# Patient Record
Sex: FEMALE | Race: BLACK OR AFRICAN AMERICAN | Employment: UNEMPLOYED | URBAN - METROPOLITAN AREA
[De-identification: names, ages, dates, MRNs, and addresses within clinical notes are randomized per-mention and may not be internally consistent; named-entity substitution may affect disease eponyms.]

---

## 2018-11-28 ENCOUNTER — OFFICE VISIT (OUTPATIENT)
Dept: ENDOCRINOLOGY | Age: 63
End: 2018-11-28
Payer: MEDICARE

## 2018-11-28 VITALS
BODY MASS INDEX: 32.62 KG/M2 | RESPIRATION RATE: 16 BRPM | WEIGHT: 207.8 LBS | HEIGHT: 67 IN | OXYGEN SATURATION: 98 % | SYSTOLIC BLOOD PRESSURE: 124 MMHG | DIASTOLIC BLOOD PRESSURE: 80 MMHG | HEART RATE: 69 BPM

## 2018-11-28 DIAGNOSIS — E55.9 VITAMIN D DEFICIENCY: ICD-10-CM

## 2018-11-28 DIAGNOSIS — E23.6 EMPTY SELLA SYNDROME (HCC): ICD-10-CM

## 2018-11-28 DIAGNOSIS — E83.52 HYPERCALCEMIA: ICD-10-CM

## 2018-11-28 DIAGNOSIS — M81.0 OSTEOPOROSIS, UNSPECIFIED OSTEOPOROSIS TYPE, UNSPECIFIED PATHOLOGICAL FRACTURE PRESENCE: ICD-10-CM

## 2018-11-28 DIAGNOSIS — E21.3 HYPERPARATHYROIDISM (HCC): Primary | ICD-10-CM

## 2018-11-28 PROCEDURE — 99204 OFFICE O/P NEW MOD 45 MIN: CPT | Performed by: INTERNAL MEDICINE

## 2018-11-28 RX ORDER — ALLOPURINOL 300 MG/1
300 TABLET ORAL DAILY
COMMUNITY
Start: 2018-11-01

## 2018-11-28 NOTE — PROGRESS NOTES
fragility fractures, muscle weakness, skin tags or increase shoe size. PAST MEDICAL HISTORY   Past Medical History:   Diagnosis Date    [facility] ([facility]     Cancer of body of stomach ([facility]     [facility]     GERD (gastroesophageal reflux disease)     Hypercholesteremia     Hypertension     Unspecified cerebral artery occlusion with cerebral infarction      PAST SURGICAL HISTORY   Past Surgical History:   Procedure Laterality Date    APPENDECTOMY      STOMACH SURGERY      cancer     SOCIAL HISTORY   Social History     Social History    Marital status:      Spouse name: N/A    Number of children: N/A    Years of education: N/A     Occupational History    Not on file. Social History Main Topics    Smoking status: Former Smoker     Quit date: 9/6/1989    Smokeless tobacco: Former User     Quit date: 11/30/1985    Alcohol use No    Drug use: No    Sexual activity: Not on file     Other Topics Concern    Not on file     Social History Narrative    No narrative on file     FAMILY HISTORY   No family history on file. ALLERGIES AND DRUG REACTIONS   Allergies   Allergen Reactions    Vicodin [Hydrocodone-Acetaminophen]        CURRENT MEDICATIONS     Current Outpatient Prescriptions   Medication Sig Dispense Refill    allopurinol (ZYLOPRIM) 300 MG tablet Take 300 mg by mouth daily       atenolol-chlorthalidone (TENORETIC) 100-25 MG per tablet Take 1 tablet by mouth daily      irbesartan (AVAPRO) 150 MG tablet Take 150 mg by mouth daily      simvastatin (ZOCOR) 40 MG tablet Take 40 mg by mouth nightly.  glimepiride (AMARYL) 4 MG tablet Take 4 mg by mouth 2 times daily.  amLODIPine (NORVASC) 10 MG tablet Take 10 mg by mouth daily.  metformin (GLUCOPHAGE) 500 MG tablet Take 1,000 mg by mouth 2 times daily (with meals)        No current facility-administered medications for this visit.         Review of Systems    Constitutional: No fever, no chills, no AST 13, GFR 55        Lab Results   Component Value Date/Time     09/06/2017 03:05 AM    K 3.4 (L) 09/06/2017 03:05 AM    CO2 25 09/06/2017 03:05 AM    BUN 22 09/06/2017 03:05 AM    CALCIUM 9.6 09/06/2017 03:05 AM      No results found for: PTH  No results found for: MG  No results found for: PHOS  No results found for: VITD25  No results found for: PTHRP  No results found for: Nitish Blow  No results found for: URINEVOLUME  Lab Results   Component Value Date/Time    WBC 6.3 09/06/2017 03:05 AM    RBC 3.98 09/06/2017 03:05 AM    HGB 11.3 (L) 09/06/2017 03:05 AM    HCT 35.4 09/06/2017 03:05 AM    MCV 88.9 09/06/2017 03:05 AM    MCH 28.4 09/06/2017 03:05 AM    MCHC 31.9 (L) 09/06/2017 03:05 AM    RDW 15.1 (H) 09/06/2017 03:05 AM     09/06/2017 03:05 AM    MPV 10.3 09/06/2017 03:05 AM     Lab Results   Component Value Date/Time    GLUCOSE 159 (H) 09/06/2017 03:05 AM    GLUCOSE 100 06/04/2014 01:15 AM    GLUCOSE 149 (H) 02/19/2013 11:02 AM       All labs medical records and images reviewed independently   Pituitary MRI 2/2008   There is a 1.7 x 1.2 x 1.2  Pituitary adenoma     Repeated MRI in 5/25/2010  Empty sella variant with some displacement of pituitary gland posterior and inferior due to CSF within the sella. No pituitary micro or macroadenoma. Appearance of sella and pituitary gland is similar to that seen on previous scan from 2/2008     Impression and plan: Nia Flowers who is 70 y.o. female in the clinic today for management hyperparathtroidism    Primary hyperparathyroidism   · PTH elevation at the time the calcium is elevated (or high normal) is most consistent with primary hyperparathyroidism.   · Late onset and the absence of other affected family members would also decrease the likelihood of this being  hyperparathyroidism associated with an MEN syndrome or [address]  · With degree of hypercalcemia she will need parathyroid surgery  · Today I will check CMP, PTH, VitD, 24 hrs urine calcium  · Ordered DXA scan to Hip, spine and none-dominant forearm    · Will follow the result and proceed accordingly     Empty sella syndrome   · MRI in 2008 showed 1.7 cm pituitary adenoma but repeated MRI in 2010 showed only empty sella syndrome and no micro or macroadenoma   · Will assess pituitary function (TSH, free T4, GH, IGF-1, AM cortisol, ACTH, Prolactin, FSH, LH)   · I will also order Pituitary MRI as last MRI was 8 years ago and pt still c/o headache     Follow up  · 8 weeks     The above issues were reviewed with the patient who understood and agreed with the plan. 30 minutes were spent today in management of this patient. More than 50% of time spent on counseling of patient on above diagnosis. Thank you for allowing us to participate in the care of this patient. Please do not hesitate to contact us with any additional questions. Diagnosis Orders   1. Hyperparathyroidism ([address]  Calcium, 24 HR Urine    Creatinine, 24 HR Urine    Comprehensive Metabolic Panel    DEXA bone density peripheral    PTH, Intact   2. Hypercalcemia  Calcium, 24 HR Urine    Creatinine, 24 HR Urine    Comprehensive Metabolic Panel    DEXA bone density peripheral    PTH, Intact   3. Vitamin D deficiency  Comprehensive Metabolic Panel    Vitamin D 25 Hydrox, D2 & D3   4. Osteoporosis, unspecified osteoporosis type, unspecified pathological fracture presence  DEXA Bone Density Axial Skeleton    DEXA bone density peripheral   5.  Empty sella syndrome ([address]  ACTH    Cortisol Total    Estradiol    Follicle Stimulating Hormone    Growth Hormone    Insulin-Like Growth Factor    Luteinizing Hormone    Prolactin    T4, Free    T4    TSH without Reflex    MRI BRAIN Bess Cota MD  Endocrinologist, [facility])   [facility] [place] [address] [facility] [address]   Phone: [phone]  Fax: [phone]  ------------------------------

## 2018-11-28 NOTE — LETTER
FAMILY HISTORY   No family history on file. ALLERGIES AND DRUG REACTIONS   Allergies   Allergen Reactions    Vicodin [Hydrocodone-Acetaminophen]        CURRENT MEDICATIONS     Current Outpatient Prescriptions   Medication Sig Dispense Refill    allopurinol (ZYLOPRIM) 300 MG tablet Take 300 mg by mouth daily       atenolol-chlorthalidone (TENORETIC) 100-25 MG per tablet Take 1 tablet by mouth daily      irbesartan (AVAPRO) 150 MG tablet Take 150 mg by mouth daily      simvastatin (ZOCOR) 40 MG tablet Take 40 mg by mouth nightly.  glimepiride (AMARYL) 4 MG tablet Take 4 mg by mouth 2 times daily.  amLODIPine (NORVASC) 10 MG tablet Take 10 mg by mouth daily.  metformin (GLUCOPHAGE) 500 MG tablet Take 1,000 mg by mouth 2 times daily (with meals)        No current facility-administered medications for this visit. Review of Systems    Constitutional: No fever, no chills, no diaphoresis, no generalized weakness. HEENT: No blurred vision, No sore throat, no ear pain, no hair loss  Neck: denied any neck swelling, difficulty swallowing,   Cadrdiopulomary: No CP, SOB or palpitation, No orthopnea or PND. No cough or wheezing. GI: No N/V/D, no constipation, No abdominal pain, no melena or hematochezia   : Denied any dysuria, hematuria, flank pain, discharge, or incontinence. Skin: denied any rash, ulcer, Hirsute, or hyperpigmentation. MSK: denied any joint deformity, joint pain/swelling, muscle pain, or back pain. Neuro: no numbess, no tingling, no weakness, __  Psychiatric/Behavioral: Negative for sleep disturbance and dysphoric mood. The patient is not nervous/anxious.      Objective:   /80 (Site: Left Upper Arm, Position: Sitting, Cuff Size: Medium Adult)   Pulse 69   Resp 16   Ht 5' 7\" (1.702 m)   Wt 207 lb 12.8 oz (94.3 kg)   SpO2 98%   BMI 32.55 kg/m²    BP Readings from Last 4 Encounters:   11/28/18 124/80   09/06/17 131/68   07/15/17 124/67   09/12/15 113/68 Wt Readings from Last 6 Encounters:   11/28/18 207 lb 12.8 oz (94.3 kg)   09/06/17 220 lb (99.8 kg)   07/15/17 210 lb (95.3 kg)   09/12/15 212 lb (96.2 kg)   12/09/14 210 lb (95.3 kg)   06/04/14 200 lb (90.7 kg)       Physical examination:  General: awake alert, no abnormal position or movements. HEENT: normocephalic non traumatic. Neck: supple, no LN enlargement, no thyromegaly, no thyroid tenderness, no JVD. Pulm: clear equal air entry no added sounds,  CVS: S1 + S2, no murmur, no heave. Abd: soft lax no tenderness, no organomegaly, audible bowel sounds. MSK: no back deformity, no local spine tesnderness  Skin: warm, no lesions, no rash. No striae no Bruises   Neuro: CN intact, sensation notmal , muscle power normal  Psych: normal mood, and affect     Lab review   3/2018  Ca 11.1, Na 140, K 3.5, Cr 1.26, GFR 52    9/26/2018  A1c 6.7, WBC 6.7, Plt 246, Hb 12.3, Na 139, K 3.8, Cr 1.2, Ca 11.3  ALT 9.  AST 13, GFR 55        Lab Results   Component Value Date/Time     09/06/2017 03:05 AM    K 3.4 (L) 09/06/2017 03:05 AM    CO2 25 09/06/2017 03:05 AM    BUN 22 09/06/2017 03:05 AM    CALCIUM 9.6 09/06/2017 03:05 AM      No results found for: PTH  No results found for: MG  No results found for: PHOS  No results found for: VITD25  No results found for: PTHRP  No results found for: Blanca Chay  No results found for: URINEVOLUME  Lab Results   Component Value Date/Time    WBC 6.3 09/06/2017 03:05 AM    RBC 3.98 09/06/2017 03:05 AM    HGB 11.3 (L) 09/06/2017 03:05 AM    HCT 35.4 09/06/2017 03:05 AM    MCV 88.9 09/06/2017 03:05 AM    MCH 28.4 09/06/2017 03:05 AM    MCHC 31.9 (L) 09/06/2017 03:05 AM    RDW 15.1 (H) 09/06/2017 03:05 AM     09/06/2017 03:05 AM    MPV 10.3 09/06/2017 03:05 AM     Lab Results   Component Value Date/Time    GLUCOSE 159 (H) 09/06/2017 03:05 AM    GLUCOSE 100 06/04/2014 01:15 AM    GLUCOSE 149 (H) 02/19/2013 11:02 AM All labs medical records and images reviewed independently   Pituitary MRI 2/2008   There is a 1.7 x 1.2 x 1.2  Pituitary adenoma     Repeated MRI in 5/25/2010  Empty sella variant with some displacement of pituitary gland posterior and inferior due to CSF within the sella. No pituitary micro or macroadenoma. Appearance of sella and pituitary gland is similar to that seen on previous scan from 2/2008     Impression and plan: Rock Potter who is 70 y.o. female in the clinic today for management hyperparathtroidism    Primary hyperparathyroidism   · PTH elevation at the time the calcium is elevated (or high normal) is most consistent with primary hyperparathyroidism. · Late onset and the absence of other affected family members would also decrease the likelihood of this being  hyperparathyroidism associated with an MEN syndrome or [address]  · With degree of hypercalcemia she will need parathyroid surgery  · Today I will check CMP, PTH, VitD, 24 hrs urine calcium  · Ordered DXA scan to Hip, spine and none-dominant forearm    · Will follow the result and proceed accordingly     Empty sella syndrome   · MRI in 2008 showed 1.7 cm pituitary adenoma but repeated MRI in 2010 showed only empty sella syndrome and no micro or macroadenoma   · Will assess pituitary function (TSH, free T4, GH, IGF-1, AM cortisol, ACTH, Prolactin, FSH, LH)   · I will also order Pituitary MRI as last MRI was 8 years ago and pt still c/o headache     Follow up  · 8 weeks     The above issues were reviewed with the patient who understood and agreed with the plan. 30 minutes were spent today in management of this patient. More than 50% of time spent on counseling of patient on above diagnosis. Thank you for allowing us to participate in the care of this patient. Please do not hesitate to contact us with any additional questions. Diagnosis Orders   1.  Hyperparathyroidism ([address]  Calcium, 24 HR Urine    Creatinine, 24 HR Urine

## 2018-11-29 ENCOUNTER — HOSPITAL ENCOUNTER (OUTPATIENT)
Age: 63
Discharge: HOME | End: 2018-11-29
Payer: MEDICARE

## 2018-11-29 DIAGNOSIS — E21.3 HYPERPARATHYROIDISM (HCC): ICD-10-CM

## 2018-11-29 DIAGNOSIS — E83.52 HYPERCALCEMIA: ICD-10-CM

## 2018-11-29 DIAGNOSIS — E55.9 VITAMIN D DEFICIENCY: ICD-10-CM

## 2018-11-29 LAB
ALBUMIN SERPL-MCNC: 4.2 G/DL (ref 3.5–5.2)
ALP BLD-CCNC: 149 U/L (ref 35–104)
ALT SERPL-CCNC: 13 U/L (ref 0–32)
ANION GAP SERPL CALCULATED.3IONS-SCNC: 13 MMOL/L (ref 7–16)
AST SERPL-CCNC: 15 U/L (ref 0–31)
BILIRUB SERPL-MCNC: 0.4 MG/DL (ref 0–1.2)
BUN BLDV-MCNC: 30 MG/DL (ref 8–23)
CALCIUM SERPL-MCNC: 11.1 MG/DL (ref 8.6–10.2)
CHLORIDE BLD-SCNC: 100 MMOL/L (ref 98–107)
CO2: 25 MMOL/L (ref 22–29)
CORTISOL TOTAL: 10.46 MCG/DL (ref 2.68–18.4)
CREAT SERPL-MCNC: 1.1 MG/DL (ref 0.5–1)
ESTRADIOL LEVEL: 10.4 PG/ML
FOLLICLE STIMULATING HORMONE: 67.9 MIU/ML
GFR AFRICAN AMERICAN: >60
GFR NON-AFRICAN AMERICAN: >60 ML/MIN/1.73
GLUCOSE BLD-MCNC: 262 MG/DL (ref 74–99)
LUTEINIZING HORMONE: 34.1 MIU/ML
PARATHYROID HORMONE INTACT: 143 PG/ML (ref 15–65)
POTASSIUM SERPL-SCNC: 3.5 MMOL/L (ref 3.5–5)
PROLACTIN: 18.8 NG/ML
SODIUM BLD-SCNC: 138 MMOL/L (ref 132–146)
T4 FREE: 0.95 NG/DL (ref 0.93–1.7)
T4 TOTAL: 6.1 MCG/DL (ref 4.5–11.7)
TOTAL PROTEIN: 8.5 G/DL (ref 6.4–8.3)
TSH SERPL DL<=0.05 MIU/L-ACNC: 1.69 UIU/ML (ref 0.27–4.2)
VITAMIN D 25-HYDROXY: 12 NG/ML (ref 30–100)

## 2018-11-29 PROCEDURE — 82533 TOTAL CORTISOL: CPT

## 2018-11-29 PROCEDURE — 83970 ASSAY OF PARATHORMONE: CPT

## 2018-11-29 PROCEDURE — 82306 VITAMIN D 25 HYDROXY: CPT

## 2018-11-29 PROCEDURE — 84439 ASSAY OF FREE THYROXINE: CPT

## 2018-11-29 PROCEDURE — 83002 ASSAY OF GONADOTROPIN (LH): CPT

## 2018-11-29 PROCEDURE — 80053 COMPREHEN METABOLIC PANEL: CPT

## 2018-11-29 PROCEDURE — 83003 ASSAY GROWTH HORMONE (HGH): CPT

## 2018-11-29 PROCEDURE — 84305 ASSAY OF SOMATOMEDIN: CPT

## 2018-11-29 PROCEDURE — 83001 ASSAY OF GONADOTROPIN (FSH): CPT

## 2018-11-29 PROCEDURE — 82024 ASSAY OF ACTH: CPT

## 2018-11-29 PROCEDURE — 82670 ASSAY OF TOTAL ESTRADIOL: CPT

## 2018-11-29 PROCEDURE — 84443 ASSAY THYROID STIM HORMONE: CPT

## 2018-11-29 PROCEDURE — 84146 ASSAY OF PROLACTIN: CPT

## 2018-11-29 PROCEDURE — 36415 COLL VENOUS BLD VENIPUNCTURE: CPT

## 2018-11-30 ENCOUNTER — TELEPHONE (OUTPATIENT)
Dept: ENDOCRINOLOGY | Age: 63
End: 2018-11-30

## 2018-11-30 DIAGNOSIS — E55.9 VITAMIN D DEFICIENCY: Primary | ICD-10-CM

## 2018-11-30 DIAGNOSIS — E21.0 PRIMARY HYPERPARATHYROIDISM (HCC): ICD-10-CM

## 2018-12-01 LAB
ADRENOCORTICOTROPIC HORMONE: 29 PG/ML (ref 6–58)
IGF-1 (INSULIN-LIKE GROWTH I): 176 NG/ML (ref 34–241)
INSULIN-LIKE GROWTH FACTOR-1 Z-SCORE: 1

## 2018-12-02 LAB — GROWTH HORMONE: <0.05 NG/ML (ref 0.05–8)

## 2018-12-03 ENCOUNTER — HOSPITAL ENCOUNTER (OUTPATIENT)
Age: 63
Discharge: HOME | End: 2018-12-03
Payer: MEDICARE

## 2018-12-03 DIAGNOSIS — E21.3 HYPERPARATHYROIDISM (HCC): ICD-10-CM

## 2018-12-03 DIAGNOSIS — E83.52 HYPERCALCEMIA: ICD-10-CM

## 2018-12-03 LAB
24HR URINE VOLUME (ML): 1700 ML
CALCIUM 24 HOUR URINE: 37 MG/24 HR (ref 100–300)
CREATININE 24 HOUR URINE: 1513 MG/24H (ref 720–1510)
Lab: 24 HOURS

## 2018-12-03 PROCEDURE — 82340 ASSAY OF CALCIUM IN URINE: CPT

## 2018-12-04 ENCOUNTER — TELEPHONE (OUTPATIENT)
Dept: ENDOCRINOLOGY | Age: 63
End: 2018-12-04

## 2018-12-05 ENCOUNTER — TELEPHONE (OUTPATIENT)
Dept: ENDOCRINOLOGY | Age: 63
End: 2018-12-05

## 2018-12-05 DIAGNOSIS — E83.52 HYPERCALCEMIA: Primary | ICD-10-CM

## 2018-12-05 RX ORDER — ATENOLOL 100 MG/1
100 TABLET ORAL DAILY
Qty: 30 TABLET | Refills: 3
Start: 2018-12-05

## 2018-12-11 ENCOUNTER — HOSPITAL ENCOUNTER (OUTPATIENT)
Dept: ULTRASOUND IMAGING | Age: 63
Discharge: HOME | End: 2018-12-13
Payer: MEDICARE

## 2018-12-11 ENCOUNTER — TELEPHONE (OUTPATIENT)
Dept: ENDOCRINOLOGY | Age: 63
End: 2018-12-11

## 2018-12-11 ENCOUNTER — HOSPITAL ENCOUNTER (OUTPATIENT)
Dept: NUCLEAR MEDICINE | Age: 63
Discharge: HOME | End: 2018-12-13
Payer: MEDICARE

## 2018-12-11 DIAGNOSIS — E21.0 PRIMARY HYPERPARATHYROIDISM (HCC): ICD-10-CM

## 2018-12-11 PROCEDURE — 76536 US EXAM OF HEAD AND NECK: CPT

## 2018-12-11 PROCEDURE — 3430000000 HC RX DIAGNOSTIC RADIOPHARMACEUTICAL: Performed by: RADIOLOGY

## 2018-12-11 PROCEDURE — 78070 PARATHYROID PLANAR IMAGING: CPT

## 2018-12-11 PROCEDURE — A9500 TC99M SESTAMIBI: HCPCS | Performed by: RADIOLOGY

## 2018-12-11 RX ADMIN — Medication 27 MILLICURIE: at 09:11

## 2018-12-21 ENCOUNTER — HOSPITAL ENCOUNTER (OUTPATIENT)
Dept: GENERAL RADIOLOGY | Age: 63
Discharge: HOME | End: 2018-12-23
Payer: MEDICARE

## 2018-12-21 ENCOUNTER — APPOINTMENT (OUTPATIENT)
Dept: GENERAL RADIOLOGY | Age: 63
End: 2018-12-21
Payer: MEDICARE

## 2018-12-21 DIAGNOSIS — M81.0 OSTEOPOROSIS, UNSPECIFIED OSTEOPOROSIS TYPE, UNSPECIFIED PATHOLOGICAL FRACTURE PRESENCE: ICD-10-CM

## 2018-12-21 PROCEDURE — 77080 DXA BONE DENSITY AXIAL: CPT

## 2018-12-30 ENCOUNTER — TELEPHONE (OUTPATIENT)
Dept: ENDOCRINOLOGY | Age: 63
End: 2018-12-30

## 2019-01-21 ENCOUNTER — HOSPITAL ENCOUNTER (OUTPATIENT)
Age: 64
Discharge: HOME | End: 2019-01-21
Payer: MEDICARE

## 2019-01-21 DIAGNOSIS — E83.52 HYPERCALCEMIA: ICD-10-CM

## 2019-01-21 LAB
24HR URINE VOLUME (ML): 3000 ML
24HR URINE VOLUME (ML): 3000 ML
CALCIUM 24 HOUR URINE: 267 MG/24 HR (ref 100–300)
CREATININE 24 HOUR URINE: 1320 MG/24H (ref 720–1510)
CREATININE 24 HOUR URINE: 1350 MG/24H (ref 720–1510)
Lab: 24 HOURS
Lab: 24 HOURS

## 2019-01-21 PROCEDURE — 82340 ASSAY OF CALCIUM IN URINE: CPT

## 2019-01-21 PROCEDURE — 82570 ASSAY OF URINE CREATININE: CPT

## 2019-01-22 ENCOUNTER — TELEPHONE (OUTPATIENT)
Dept: ENDOCRINOLOGY | Age: 64
End: 2019-01-22

## 2019-01-23 ENCOUNTER — OFFICE VISIT (OUTPATIENT)
Dept: ENDOCRINOLOGY | Age: 64
End: 2019-01-23
Payer: MEDICARE

## 2019-01-23 ENCOUNTER — HOSPITAL ENCOUNTER (OUTPATIENT)
Age: 64
Discharge: HOME | End: 2019-01-23
Payer: MEDICARE

## 2019-01-23 ENCOUNTER — TELEPHONE (OUTPATIENT)
Dept: ENDOCRINOLOGY | Age: 64
End: 2019-01-23

## 2019-01-23 VITALS
HEART RATE: 70 BPM | DIASTOLIC BLOOD PRESSURE: 88 MMHG | OXYGEN SATURATION: 97 % | HEIGHT: 67 IN | SYSTOLIC BLOOD PRESSURE: 140 MMHG | RESPIRATION RATE: 16 BRPM | WEIGHT: 216.8 LBS | BODY MASS INDEX: 34.03 KG/M2

## 2019-01-23 DIAGNOSIS — E21.0 PRIMARY HYPERPARATHYROIDISM (HCC): Primary | ICD-10-CM

## 2019-01-23 DIAGNOSIS — E55.9 VITAMIN D DEFICIENCY: ICD-10-CM

## 2019-01-23 DIAGNOSIS — E83.52 HYPERCALCEMIA: ICD-10-CM

## 2019-01-23 DIAGNOSIS — E21.3 HYPERPARATHYROIDISM (HCC): Primary | ICD-10-CM

## 2019-01-23 DIAGNOSIS — E23.6 EMPTY SELLA SYNDROME (HCC): ICD-10-CM

## 2019-01-23 DIAGNOSIS — E21.3 HYPERPARATHYROIDISM (HCC): ICD-10-CM

## 2019-01-23 LAB
ALBUMIN SERPL-MCNC: 3.9 G/DL (ref 3.5–5.2)
ANION GAP SERPL CALCULATED.3IONS-SCNC: 9 MMOL/L (ref 7–16)
BUN BLDV-MCNC: 23 MG/DL (ref 8–23)
CALCIUM SERPL-MCNC: 11.3 MG/DL (ref 8.6–10.2)
CHLORIDE BLD-SCNC: 103 MMOL/L (ref 98–107)
CO2: 26 MMOL/L (ref 22–29)
CREAT SERPL-MCNC: 1 MG/DL (ref 0.5–1)
GFR AFRICAN AMERICAN: >60
GFR NON-AFRICAN AMERICAN: >60 ML/MIN/1.73
GLUCOSE BLD-MCNC: 236 MG/DL (ref 74–99)
PARATHYROID HORMONE INTACT: 135 PG/ML (ref 15–65)
POTASSIUM SERPL-SCNC: 4.2 MMOL/L (ref 3.5–5)
SODIUM BLD-SCNC: 138 MMOL/L (ref 132–146)
VITAMIN D 25-HYDROXY: 21 NG/ML (ref 30–100)

## 2019-01-23 PROCEDURE — 82040 ASSAY OF SERUM ALBUMIN: CPT

## 2019-01-23 PROCEDURE — 36415 COLL VENOUS BLD VENIPUNCTURE: CPT

## 2019-01-23 PROCEDURE — 99214 OFFICE O/P EST MOD 30 MIN: CPT | Performed by: INTERNAL MEDICINE

## 2019-01-23 PROCEDURE — 82306 VITAMIN D 25 HYDROXY: CPT

## 2019-01-23 PROCEDURE — 80048 BASIC METABOLIC PNL TOTAL CA: CPT

## 2019-01-23 PROCEDURE — 83970 ASSAY OF PARATHORMONE: CPT

## 2019-07-23 ENCOUNTER — OFFICE VISIT (OUTPATIENT)
Dept: ENDOCRINOLOGY | Age: 64
End: 2019-07-23
Payer: COMMERCIAL

## 2019-07-23 VITALS
HEART RATE: 80 BPM | WEIGHT: 213 LBS | BODY MASS INDEX: 34.23 KG/M2 | RESPIRATION RATE: 16 BRPM | SYSTOLIC BLOOD PRESSURE: 126 MMHG | DIASTOLIC BLOOD PRESSURE: 74 MMHG | OXYGEN SATURATION: 96 % | HEIGHT: 66 IN

## 2019-07-23 DIAGNOSIS — E21.0 PRIMARY HYPERPARATHYROIDISM (HCC): ICD-10-CM

## 2019-07-23 DIAGNOSIS — E83.52 HYPERCALCEMIA: Primary | ICD-10-CM

## 2019-07-23 DIAGNOSIS — E23.6 EMPTY SELLA SYNDROME (HCC): ICD-10-CM

## 2019-07-23 PROCEDURE — 99214 OFFICE O/P EST MOD 30 MIN: CPT | Performed by: INTERNAL MEDICINE

## 2019-07-23 NOTE — LETTER
 Financial resource strain: Not on [email or web address] insecurity:     Worry: Not on file     Inability: Not on file    Transportation needs:     Medical: Not on file     Non-medical: Not on file   Tobacco Use    Smoking status: Former Smoker     Last attempt to quit: 1989     Years since quittin.8    Smokeless tobacco: Former User     Quit date: 1985   Substance and Sexual Activity    Alcohol use: No    Drug use: No    Sexual activity: Not on file   Lifestyle    Physical activity:     Days per week: Not on file     Minutes per session: Not on file    Stress: Not on file   Relationships    Social connections:     Talks on phone: Not on file     Gets together: Not on file     Attends [employer] service: Not on file     Active member of club or organization: Not on file     Attends meetings of clubs or organizations: Not on file     Relationship status: Not on file    Intimate partner violence:     Fear of current or ex partner: Not on file     Emotionally abused: Not on file     Physically abused: Not on file     Forced sexual activity: Not on file   Other Topics Concern    Not on file   Social History Narrative    Not on file     FAMILY HISTORY   No family history on file. ALLERGIES AND DRUG REACTIONS   Allergies   Allergen Reactions    Vicodin [Hydrocodone-Acetaminophen]        CURRENT MEDICATIONS     Current Outpatient Medications   Medication Sig Dispense Refill    insulin glargine (BASAGLAR KWIKPEN) 100 UNIT/ML injection pen Inject 40 Units into the skin daily      atenolol (TENORMIN) 100 MG tablet Take 1 tablet by mouth daily 30 tablet 3    allopurinol (ZYLOPRIM) 300 MG tablet Take 300 mg by mouth daily       irbesartan (AVAPRO) 150 MG tablet Take 150 mg by mouth daily      simvastatin (ZOCOR) 40 MG tablet Take 40 mg by mouth nightly.  glimepiride (AMARYL) 4 MG tablet Take 4 mg by mouth 2 times daily.  amLODIPine (NORVASC) 10 MG tablet Take 10 mg by mouth daily.  metformin (GLUCOPHAGE) 500 MG tablet Take 1,000 mg by mouth 2 times daily (with meals)       vitamin D (CHOLECALCIFEROL) 66591 UNIT CAPS Take 1 capsule by mouth once a week for 4 doses 4 capsule 0     No current facility-administered medications for this visit. Review of Systems    Constitutional: No fever, no chills, no diaphoresis, no generalized weakness. HEENT: No blurred vision, No sore throat, no ear pain, no hair loss  Neck: denied any neck swelling, difficulty swallowing,   Cadrdiopulomary: No CP, SOB or palpitation, No orthopnea or PND. No cough or wheezing. GI: No N/V/D, no constipation, No abdominal pain, no melena or hematochezia   : Denied any dysuria, hematuria, flank pain, discharge, or incontinence. Skin: denied any rash, ulcer, Hirsute, or hyperpigmentation. MSK: denied any joint deformity, joint pain/swelling, muscle pain, or back pain. Neuro: no numbess, no tingling, no weakness, __  Psychiatric/Behavioral: Negative for sleep disturbance and dysphoric mood. The patient is not nervous/anxious. Objective:   /74   Pulse 80   Resp 16   Ht 5' 6\" (1.676 m)   Wt 213 lb (96.6 kg)   SpO2 96%   BMI 34.38 kg/m²    BP Readings from Last 4 Encounters:   07/23/19 126/74   01/23/19 (!) 140/88   11/28/18 124/80   09/06/17 131/68     Wt Readings from Last 6 Encounters:   07/23/19 213 lb (96.6 kg)   01/23/19 216 lb 12.8 oz (98.3 kg)   11/28/18 207 lb 12.8 oz (94.3 kg)   09/06/17 220 lb (99.8 kg)   07/15/17 210 lb (95.3 kg)       Physical examination:  General: awake alert, no abnormal position or movements. HEENT: normocephalic non traumatic. Neck: supple, no LN enlargement, no thyromegaly, no thyroid tenderness, no JVD. Pulm: clear equal air entry no added sounds,  CVS: S1 + S2, no murmur, no heave. Abd: soft lax no tenderness, no organomegaly, audible bowel sounds. MSK: no back deformity, no local spine tesnderness  Skin: warm, no lesions, no rash.  No striae no Bruises

## 2019-07-23 NOTE — PROGRESS NOTES
ENDOCRINOLOGY CLINIC NOTE    Date of Service: 7/23/19    Primary Care Physician: Jerry Paz MD.  Provider: Waqar Juares MD       Reason for the visit:  Primary hyperparathyroidism , Empty sella syndrome     HPI  The history is provided by the patient. No  was used. Accuracy of the patient data is excellent. Gwne Watts is a very pleasant 70 y.o. female seen at endocrine clinic today for follow up on primary hyperparathyroidism and empty sella syndrome     The patient was diagnosed with hypercalcemia and primary hyperparathyroidism many years ago   She underwent surgical resection of Rt lower parathyroid gland in 2007 at [facility] but remained hypercalcemic     Because of sever hypercalcemia (Ca 11.3-11. 9) with high normal 24hr urine calcium she underwent another surgical resection to left upper and right upper parathyroid glands on 3/6/19 at [facility] by Dr. Reinier Castellanos. On the morning following surgery, her serum calcium measured 11.5 with a corresponding PTH of 58 indicating persistence primary hyperparathyroidism     Repeated labs 3/26/2019 - PTH 82, Ca 11.9     24 hr urine calcium 1/20/2018 was 267 mg/24hr      DXA scan 12/21/2018  LP T score was  2.0   Total Hip  T score 0.5     Thyroid US --> small 8 mm hypoechoic left thyroid nodule     Regarding Empty sella syndrome   The patient was diagnosed with pituitary macroadenoma in 2008   Pituitary MRI 2/2008  --> There is a 1.7 x 1.2 x 1.2 cm Pituitary adenoma     Repeated MRI in 5/25/2010  Empty sella variant with some displacement of pituitary gland posterior and inferior due to CSF within the sella. No pituitary micro or macroadenoma.  Appearance of sella and pituitary gland is similar to that seen on previous scan from 2/2008     Pituitary hormonal evaluation 11/29/2018 11/29/2018 09:51   Cortisol 10.46   ACTH 29   FSH 67.9   LH 34.1   Prolactin 18.80   Estradiol 10.4    (H)   T4, Total 6.1   TSH 1.690 31.9 (L) 09/06/2017 03:05 AM    RDW 15.1 (H) 09/06/2017 03:05 AM     09/06/2017 03:05 AM    MPV 10.3 09/06/2017 03:05 AM     Lab Results   Component Value Date/Time    GLUCOSE 236 (H) 01/23/2019 12:50 PM    GLUCOSE 262 (H) 11/29/2018 09:51 AM    GLUCOSE 159 (H) 09/06/2017 03:05 AM    GLUCOSE 100 06/04/2014 01:15 AM       All labs medical records and images reviewed independently   Pituitary MRI 2/2008   There is a 1.7 x 1.2 x 1.2  Pituitary adenoma     Repeated MRI in 5/25/2010  Empty sella variant with some displacement of pituitary gland posterior and inferior due to CSF within the sella. No pituitary micro or macroadenoma. Appearance of sella and pituitary gland is similar to that seen on previous scan from 2/2008     Impression and plan: Jesus Godwin who is 70 y.o. female in the clinic today for management hyperparathtroidism    Primary hyperparathyroidism (PHPT)   · S/p parathyroid surgery x 2. First in 2007 at [facility] and second in 7/2019 at [facility] (Rt upper, Rt lower, Lt upper parathyroid gland are resected)   · Unfortunately calcium level remained very high after surgery (last Ca level was 11.9 in 3/2019)  · Will repeat CMP, PTH and vitD level  · Will likely start Sensipar treatment  After reviewing lab result     vitD deficiency   · Continue vitD supplement     Empty sella syndrome   · MRI in 2008 showed 1.7 cm pituitary adenoma but repeated MRI in 2010 showed only empty sella syndrome and no micro or macroadenoma   · Full assessment of pituitary function was normal in 11/2018     Return in about 4 months (around 11/23/2019). The above issues were reviewed with the patient who understood and agreed with the plan. 30 minutes were spent today in management of this patient. More than 50% of time spent on counseling of patient on above diagnosis. Thank you for allowing us to participate in the care of this patient.  Please do not hesitate to contact us with any additional

## 2019-08-05 ENCOUNTER — HOSPITAL ENCOUNTER (OUTPATIENT)
Age: 64
Discharge: HOME | End: 2019-08-05
Payer: COMMERCIAL

## 2019-08-05 ENCOUNTER — TELEPHONE (OUTPATIENT)
Dept: ENDOCRINOLOGY | Age: 64
End: 2019-08-05

## 2019-08-05 DIAGNOSIS — R94.6 ABNORMAL THYROID FUNCTION TEST: ICD-10-CM

## 2019-08-05 DIAGNOSIS — E23.6 EMPTY SELLA SYNDROME (HCC): ICD-10-CM

## 2019-08-05 DIAGNOSIS — E21.0 PRIMARY HYPERPARATHYROIDISM (HCC): ICD-10-CM

## 2019-08-05 DIAGNOSIS — E83.52 HYPERCALCEMIA: Primary | ICD-10-CM

## 2019-08-05 DIAGNOSIS — E83.52 HYPERCALCEMIA: ICD-10-CM

## 2019-08-05 LAB
ALBUMIN SERPL-MCNC: 3.8 G/DL (ref 3.5–5.2)
ALP BLD-CCNC: 168 U/L (ref 35–104)
ALT SERPL-CCNC: 10 U/L (ref 0–32)
ANION GAP SERPL CALCULATED.3IONS-SCNC: 9 MMOL/L (ref 7–16)
AST SERPL-CCNC: 15 U/L (ref 0–31)
BILIRUB SERPL-MCNC: 0.2 MG/DL (ref 0–1.2)
BUN BLDV-MCNC: 24 MG/DL (ref 8–23)
CALCIUM SERPL-MCNC: 10.5 MG/DL (ref 8.6–10.2)
CHLORIDE BLD-SCNC: 105 MMOL/L (ref 98–107)
CO2: 24 MMOL/L (ref 22–29)
CREAT SERPL-MCNC: 1.1 MG/DL (ref 0.5–1)
GFR AFRICAN AMERICAN: >60
GFR NON-AFRICAN AMERICAN: >60 ML/MIN/1.73
GLUCOSE BLD-MCNC: 249 MG/DL (ref 74–99)
PARATHYROID HORMONE INTACT: 106 PG/ML (ref 15–65)
POTASSIUM SERPL-SCNC: 3.9 MMOL/L (ref 3.5–5)
SODIUM BLD-SCNC: 138 MMOL/L (ref 132–146)
T4 FREE: 0.8 NG/DL (ref 0.93–1.7)
TOTAL PROTEIN: 8 G/DL (ref 6.4–8.3)
TSH SERPL DL<=0.05 MIU/L-ACNC: 1.4 UIU/ML (ref 0.27–4.2)
VITAMIN D 25-HYDROXY: 23 NG/ML (ref 30–100)

## 2019-08-05 PROCEDURE — 83970 ASSAY OF PARATHORMONE: CPT

## 2019-08-05 PROCEDURE — 36415 COLL VENOUS BLD VENIPUNCTURE: CPT

## 2019-08-05 PROCEDURE — 84439 ASSAY OF FREE THYROXINE: CPT

## 2019-08-05 PROCEDURE — 84443 ASSAY THYROID STIM HORMONE: CPT

## 2019-08-05 PROCEDURE — 80053 COMPREHEN METABOLIC PANEL: CPT

## 2019-08-05 PROCEDURE — 82306 VITAMIN D 25 HYDROXY: CPT

## 2019-08-05 RX ORDER — CINACALCET 30 MG/1
30 TABLET, FILM COATED ORAL DAILY
Qty: 30 TABLET | Refills: 5 | Status: SHIPPED | OUTPATIENT
Start: 2019-08-05 | End: 2020-02-03

## 2019-08-06 NOTE — TELEPHONE ENCOUNTER
Notify pt,  I have reviewed your recent results    · Calcium level still elevated. Start Sensipar 30 mg daily in the morning and repeat lab in 6 weeks. Order is in   · vitD level was low, please take vitamin D3  2000 iu/day over the counter   · Thyroid hormones are slightly below normal range.  I want her to repeat thyroid hormones with next calcium level in 6 weeks and consider starting thyroid medication if level remained low

## 2019-08-07 RX ORDER — MULTIVIT-MIN/IRON/FOLIC ACID/K 18-600-40
CAPSULE ORAL DAILY
COMMUNITY
End: 2019-08-10 | Stop reason: SDUPTHER

## 2019-08-10 ENCOUNTER — TELEPHONE (OUTPATIENT)
Dept: ENDOCRINOLOGY | Age: 64
End: 2019-08-10

## 2019-08-10 DIAGNOSIS — E55.9 VITAMIN D DEFICIENCY: Primary | ICD-10-CM

## 2019-08-10 RX ORDER — MULTIVIT-MIN/IRON/FOLIC ACID/K 18-600-40
CAPSULE ORAL
Qty: 90 CAPSULE | Refills: 3 | Status: SHIPPED | OUTPATIENT
Start: 2019-08-10 | End: 2020-09-02

## 2019-11-06 ENCOUNTER — OFFICE VISIT (OUTPATIENT)
Dept: ENDOCRINOLOGY | Age: 64
End: 2019-11-06
Payer: COMMERCIAL

## 2019-11-06 VITALS
WEIGHT: 217.2 LBS | SYSTOLIC BLOOD PRESSURE: 136 MMHG | RESPIRATION RATE: 16 BRPM | HEART RATE: 78 BPM | BODY MASS INDEX: 34.09 KG/M2 | HEIGHT: 67 IN | DIASTOLIC BLOOD PRESSURE: 78 MMHG | OXYGEN SATURATION: 98 %

## 2019-11-06 DIAGNOSIS — R94.6 ABNORMAL THYROID FUNCTION TEST: ICD-10-CM

## 2019-11-06 DIAGNOSIS — E83.52 HYPERCALCEMIA: ICD-10-CM

## 2019-11-06 DIAGNOSIS — E55.9 VITAMIN D DEFICIENCY: Primary | ICD-10-CM

## 2019-11-06 DIAGNOSIS — E23.6 EMPTY SELLA SYNDROME (HCC): ICD-10-CM

## 2019-11-06 DIAGNOSIS — E21.0 PRIMARY HYPERPARATHYROIDISM (HCC): ICD-10-CM

## 2019-11-06 PROCEDURE — 99214 OFFICE O/P EST MOD 30 MIN: CPT | Performed by: INTERNAL MEDICINE

## 2019-11-06 PROCEDURE — G8484 FLU IMMUNIZE NO ADMIN: HCPCS | Performed by: INTERNAL MEDICINE

## 2019-11-06 PROCEDURE — G8399 PT W/DXA RESULTS DOCUMENT: HCPCS | Performed by: INTERNAL MEDICINE

## 2019-11-06 PROCEDURE — 2022F DILAT RTA XM EVC RTNOPTHY: CPT | Performed by: INTERNAL MEDICINE

## 2019-11-06 PROCEDURE — 4040F PNEUMOC VAC/ADMIN/RCVD: CPT | Performed by: INTERNAL MEDICINE

## 2019-11-06 PROCEDURE — 3017F COLORECTAL CA SCREEN DOC REV: CPT | Performed by: INTERNAL MEDICINE

## 2019-11-06 PROCEDURE — 1123F ACP DISCUSS/DSCN MKR DOCD: CPT | Performed by: INTERNAL MEDICINE

## 2019-11-06 PROCEDURE — 1036F TOBACCO NON-USER: CPT | Performed by: INTERNAL MEDICINE

## 2019-11-06 PROCEDURE — 1090F PRES/ABSN URINE INCON ASSESS: CPT | Performed by: INTERNAL MEDICINE

## 2019-11-06 PROCEDURE — G8417 CALC BMI ABV UP PARAM F/U: HCPCS | Performed by: INTERNAL MEDICINE

## 2019-11-06 PROCEDURE — 3046F HEMOGLOBIN A1C LEVEL >9.0%: CPT | Performed by: INTERNAL MEDICINE

## 2019-11-06 PROCEDURE — G8427 DOCREV CUR MEDS BY ELIG CLIN: HCPCS | Performed by: INTERNAL MEDICINE

## 2020-02-03 RX ORDER — CINACALCET 30 MG/1
TABLET, FILM COATED ORAL
Qty: 90 TABLET | Refills: 2 | Status: SHIPPED
Start: 2020-02-03 | End: 2020-02-13 | Stop reason: SDUPTHER

## 2020-02-13 ENCOUNTER — TELEPHONE (OUTPATIENT)
Dept: ENDOCRINOLOGY | Age: 65
End: 2020-02-13

## 2020-02-13 RX ORDER — CINACALCET 30 MG/1
TABLET, FILM COATED ORAL
Qty: 60 TABLET | Refills: 11 | Status: SHIPPED
Start: 2020-02-13 | End: 2021-01-28

## 2020-05-05 ENCOUNTER — HOSPITAL ENCOUNTER (OUTPATIENT)
Age: 65
Discharge: HOME | End: 2020-05-05
Payer: MEDICARE

## 2020-05-05 LAB
ANION GAP SERPL CALCULATED.3IONS-SCNC: 10 MMOL/L (ref 7–16)
BUN BLDV-MCNC: 22 MG/DL (ref 8–23)
CALCIUM SERPL-MCNC: 8.7 MG/DL (ref 8.6–10.2)
CHLORIDE BLD-SCNC: 106 MMOL/L (ref 98–107)
CO2: 24 MMOL/L (ref 22–29)
CREAT SERPL-MCNC: 1.3 MG/DL (ref 0.5–1)
GFR AFRICAN AMERICAN: 49
GFR NON-AFRICAN AMERICAN: 49 ML/MIN/1.73
GLUCOSE BLD-MCNC: 109 MG/DL (ref 74–99)
PARATHYROID HORMONE INTACT: 43 PG/ML (ref 15–65)
POTASSIUM SERPL-SCNC: 4.5 MMOL/L (ref 3.5–5)
SODIUM BLD-SCNC: 140 MMOL/L (ref 132–146)
T4 FREE: 0.79 NG/DL (ref 0.93–1.7)
TSH SERPL DL<=0.05 MIU/L-ACNC: 2.01 UIU/ML (ref 0.27–4.2)
VITAMIN D 25-HYDROXY: 38 NG/ML (ref 30–100)

## 2020-05-05 PROCEDURE — 84443 ASSAY THYROID STIM HORMONE: CPT

## 2020-05-05 PROCEDURE — 36415 COLL VENOUS BLD VENIPUNCTURE: CPT

## 2020-05-05 PROCEDURE — 83970 ASSAY OF PARATHORMONE: CPT

## 2020-05-05 PROCEDURE — 82306 VITAMIN D 25 HYDROXY: CPT

## 2020-05-05 PROCEDURE — 84439 ASSAY OF FREE THYROXINE: CPT

## 2020-05-05 PROCEDURE — 80048 BASIC METABOLIC PNL TOTAL CA: CPT

## 2020-05-11 ENCOUNTER — VIRTUAL VISIT (OUTPATIENT)
Dept: ENDOCRINOLOGY | Age: 65
End: 2020-05-11
Payer: MEDICARE

## 2020-05-11 PROCEDURE — G8417 CALC BMI ABV UP PARAM F/U: HCPCS | Performed by: INTERNAL MEDICINE

## 2020-05-11 PROCEDURE — G8427 DOCREV CUR MEDS BY ELIG CLIN: HCPCS | Performed by: INTERNAL MEDICINE

## 2020-05-11 PROCEDURE — 1090F PRES/ABSN URINE INCON ASSESS: CPT | Performed by: INTERNAL MEDICINE

## 2020-05-11 PROCEDURE — 99214 OFFICE O/P EST MOD 30 MIN: CPT | Performed by: INTERNAL MEDICINE

## 2020-05-11 PROCEDURE — 4040F PNEUMOC VAC/ADMIN/RCVD: CPT | Performed by: INTERNAL MEDICINE

## 2020-05-11 PROCEDURE — G8399 PT W/DXA RESULTS DOCUMENT: HCPCS | Performed by: INTERNAL MEDICINE

## 2020-05-11 PROCEDURE — 1036F TOBACCO NON-USER: CPT | Performed by: INTERNAL MEDICINE

## 2020-05-11 PROCEDURE — 1123F ACP DISCUSS/DSCN MKR DOCD: CPT | Performed by: INTERNAL MEDICINE

## 2020-05-11 PROCEDURE — 3017F COLORECTAL CA SCREEN DOC REV: CPT | Performed by: INTERNAL MEDICINE

## 2020-05-11 NOTE — PROGRESS NOTES
[address] [place] [address] Department of Endocrinology Diabetes and Metabolism   [address] [facility] [address]   Phone: [phone]  Fax: [phone]    Date of Service: 5/11/20    Primary Care Physician: Jyotsna Guajardo MD.  Provider: Melani Bee MD       Reason for the visit:  Primary hyperparathyroidism , Empty sella syndrome     HPI  The history is provided by the patient. No  was used. Accuracy of the patient data is excellent. Darci Simon is a very pleasant 75 y.o. female seen for follow up visit on primary hyperparathyroidism and empty sella syndrome      The patient was diagnosed with hypercalcemia and primary hyperparathyroidism many years ago   She underwent surgical resection of Rt lower parathyroid gland in 2007 at [facility] but remained hypercalcemic     Because of sever hypercalcemia (Ca 11.3-11. 9) with high normal 24hr urine calcium she underwent another surgical resection to left upper and right upper parathyroid glands on 3/6/19 at [facility] clinic by Dr. Brigitte Medrano. On the morning following surgery, her serum calcium measured 11.5 with a corresponding PTH of 58 indicating persistence primary hyperparathyroidism     Repeated labs 3/26/2019 - PTH 82, Ca 11.9     24 hr urine calcium 1/20/2018 was 267 mg/24hr      DXA scan 12/21/2018 was normal   LP T score was  2.0   Total Hip  T score 0.5     Pt currently on Sensipar 30 mg BID. Recent labs showed normal Ca and PTH     5/5/2020   Calcium 8.7   PTH 43       Thyroid US --> small 8 mm hypoechoic left thyroid nodule     Regarding Empty sella syndrome   The patient was diagnosed with pituitary macroadenoma in 2008   Pituitary MRI 2/2008  --> There is a 1.7 x 1.2 x 1.2 cm Pituitary adenoma     Repeated MRI in 5/25/2010  Empty sella variant with some displacement of pituitary gland posterior and inferior due to CSF within the sella. No pituitary micro or macroadenoma.  Appearance of sella and pituitary gland is similar to that seen on previous scan from 2/2008     Pituitary hormonal evaluation 11/29/2018 11/29/2018 09:51   Cortisol 10.46   ACTH 29   FSH 67.9   LH 34.1   Prolactin 18.80   Estradiol 10.4    (H)   T4, Total 6.1   TSH 1.690   T4 Free 0.95     The patient reported h/o headache and blurry vision but denied history of Galactorrhea, visual field defect, easy bruising, think skin, Plethora, hypertension, Striae, Acne, fragility fractures, muscle weakness, skin tags or increase shoe size    DM type 2  Managed by her PCP     PAST MEDICAL HISTORY   Past Medical History:   Diagnosis Date    [facility]     Cancer of body of stomach [facility]     Diabetes mellitus [facility]     GERD (gastroesophageal reflux disease)     Hypercholesteremia     Hypertension     Unspecified cerebral artery occlusion with cerebral infarction      PAST SURGICAL HISTORY   Past Surgical History:   Procedure Laterality Date    APPENDECTOMY      STOMACH SURGERY      cancer     SOCIAL HISTORY   Tobacco:   reports that she quit smoking about 30 years ago. She quit smokeless tobacco use about 34 years ago. Alcol:   reports no history of alcohol use. Illicit Drugs:   reports no history of drug use. FAMILY HISTORY   No family history on file.     ALLERGIES AND DRUG REACTIONS   Allergies   Allergen Reactions    Vicodin [Hydrocodone-Acetaminophen]        CURRENT MEDICATIONS     Current Outpatient Medications   Medication Sig Dispense Refill    cinacalcet (SENSIPAR) 30 MG tablet take 1 tablet by mouth twice a day 60 tablet 11    Cholecalciferol (VITAMIN D) 2000 units CAPS capsule Take 1 capsule daily 90 capsule 3    insulin glargine (BASAGLAR KWIKPEN) 100 UNIT/ML injection pen 35 units daily 15 pen 5    atenolol (TENORMIN) 100 MG tablet Take 1 tablet by mouth daily 30 tablet 3    allopurinol (ZYLOPRIM) 300 MG tablet Take 300 mg by mouth daily       irbesartan (AVAPRO) 150 MG tablet Take 150 mg by mouth daily declaration under the [facility] waiver authority and the [employer] Act, this Virtual  Visit was conducted, with patient's consent, to reduce the patient's risk of exposure to COVID-19 and provide continuity of care.

## 2020-05-11 NOTE — LETTER
[facility] [address] [place] [address] Department of Endocrinology Diabetes and Metabolism   [address] [place] [address]   Phone: [phone]  Fax: [phone]      Provider: Giovanna Heredia MD  Primary Care Physician: Hugo العراقي MD   Referring Provider: No ref. provider found    Patient: Tatum Jones  YOB: 1955  Date of Visit: 5/11/2020      Dear Dr. Hugo العراقي MD   I had the pleasure of seeing your patient Tatum Jones today at endocrine clinic for follow up visit and I enclosed a copy of the office visit completed today. Thank you very much for asking us to participate in the care of this very pleasant patient. Please don't hesitate to call if there are any further questions or concerns. Sincerely   Giovanna Heredia MD  Endocrinologist, [address] [address]   Phone: [phone]  Fax: [phone]      [address] [place] [address] Department of Endocrinology Diabetes and Metabolism   [address] [place] [address], [address] [place] [address],[place] [address]   Phone: [phone]  Fax: [phone]    Date of Service: 5/11/20    Primary Care Physician: Hugo العراقي MD.  Provider: Giovanna Heredia MD       Reason for the visit:  Primary hyperparathyroidism , Empty sella syndrome     HPI  The history is provided by the patient. No  was used. Accuracy of the patient data is excellent. Tatum Jones is a very pleasant 75 y.o. female seen for follow up visit on primary hyperparathyroidism and empty sella syndrome      The patient was diagnosed with hypercalcemia and primary hyperparathyroidism many years ago   She underwent surgical resection of Rt lower parathyroid gland in 2007 at [facility] but remained hypercalcemic     Because of sever hypercalcemia (Ca 11.3-11. 9) with high normal 24hr urine calcium she underwent another surgical resection to left upper and right Skin: denied any rash, ulcer, Hirsute, or hyperpigmentation. MSK: denied any joint deformity, joint pain/swelling, muscle pain, or back pain. Neuro: no numbess, no tingling, no weakness, __  Psychiatric/Behavioral: Negative for sleep disturbance and dysphoric mood. The patient is not nervous/anxious. Objective: There were no vitals taken for this visit. BP Readings from Last 4 Encounters:   11/06/19 136/78   07/23/19 126/74   01/23/19 (!) 140/88   11/28/18 124/80     Wt Readings from Last 6 Encounters:   11/06/19 217 lb 3.2 oz (98.5 kg)   07/23/19 213 lb (96.6 kg)   01/23/19 216 lb 12.8 oz (98.3 kg)   11/28/18 207 lb 12.8 oz (94.3 kg)   09/06/17 220 lb (99.8 kg)   07/15/17 210 lb (95.3 kg)     Physical examination:  Due to this being a TeleHealth encounter, evaluation of the following organ systems is limited: Vitals/Constitutional/EENT/Resp/CV/GI//MS/Neuro/Skin/Heme-Lymph-Imm. Modified physical exam through Telemedicine camera    General: Communicating well via camera   Neck: no obvious neck mass. No obvious neck deformity     CVS: no distress   Chest: no distress. Chest is moving with respiration    Extremities:  no visible tremor  Skin: No visible rashes as seen from camera   Musculoskeletal: no visible deformity  Neuro: Alert and oriented to person, place, and time. Psychiatric: Normal mood and affect.  Behavior is normal    Lab review   I have reviewed the following lab results  Lab Results   Component Value Date/Time     05/05/2020 12:00 PM    K 4.5 05/05/2020 12:00 PM    CO2 24 05/05/2020 12:00 PM    BUN 22 05/05/2020 12:00 PM    CALCIUM 8.7 05/05/2020 12:00 PM      Lab Results   Component Value Date    PTH 43 05/05/2020     08/05/2019     01/23/2019     11/29/2018     No results found for: MG  No results found for: PHOS  Lab Results   Component Value Date    VITD25 38 05/05/2020    VITD25 23 08/05/2019    VITD25 21 01/23/2019    VITD25 12 11/29/2018 Return in about 6 months (around 11/11/2020) for Primary hyperparathyroidism . The above issues were reviewed with the patient who understood and agreed with the plan. Thank you for allowing us to participate in the care of this patient. Please do not hesitate to contact us with any additional questions. Diagnosis Orders   1. Hypercalcemia  Basic Metabolic Panel    Vitamin D 25 Hydroxy   2. Primary hyperparathyroidism (HCC)  Basic Metabolic Panel    Vitamin D 25 Hydroxy   3. Empty sella syndrome ([address]     4. Vitamin D deficiency  Vitamin D 25 Hydroxy     Brittany Trejo MD  Endocrinologist, [facility])   [address] [facility], [address] [place] [address],[address] [address]   Phone: [phone]  Fax: [phone]  ------------------------------  An electronic signature was used to authenticate this note. Henry Hills MD on 5/11/2020 at 12:49 PM  Services were provided through a video synchronous discussion virtually to substitute for in-person clinic visit. Pursuant to the emergency declaration under the [facility] waiver authority and the [employer] and [employer] Act, this Virtual  Visit was conducted, with patient's consent, to reduce the patient's risk of exposure to COVID-19 and provide continuity of care.

## 2020-09-02 RX ORDER — GLUCOSAMINE/CHONDR SU A SOD 750-600 MG
TABLET ORAL
Qty: 90 CAPSULE | Refills: 3 | Status: SHIPPED
Start: 2020-09-02 | End: 2021-09-13

## 2020-09-02 NOTE — TELEPHONE ENCOUNTER
Name of Medication(s) Requested:  Vit D 2000 U    Pharmacy Requested:   [facility] [place] [place]    Medication(s) pended? [x] Yes  [] No    Last Appointment:  5/11/2020    Future appts:  Future Appointments   Date Time Provider Chriss Joy   11/9/2020 11:20 AM [facility]        Does patient need call back?   [] Yes  [x] No

## 2021-01-27 VITALS
RESPIRATION RATE: 18 BRPM | WEIGHT: 211 LBS | HEIGHT: 66 IN | SYSTOLIC BLOOD PRESSURE: 108 MMHG | HEART RATE: 68 BPM | BODY MASS INDEX: 33.91 KG/M2 | DIASTOLIC BLOOD PRESSURE: 70 MMHG

## 2021-01-27 RX ORDER — TRIAMCINOLONE ACETONIDE 1 MG/G
CREAM TOPICAL 2 TIMES DAILY
COMMUNITY

## 2021-01-27 RX ORDER — OXAZEPAM 30 MG/1
30 CAPSULE, GELATIN COATED ORAL NIGHTLY PRN
COMMUNITY

## 2021-01-27 RX ORDER — IBUPROFEN 800 MG/1
800 TABLET ORAL 3 TIMES DAILY
COMMUNITY

## 2021-01-27 RX ORDER — EMPAGLIFLOZIN 25 MG/1
25 TABLET, FILM COATED ORAL DAILY
COMMUNITY

## 2021-01-27 RX ORDER — POTASSIUM CHLORIDE 1500 MG/1
20 TABLET, FILM COATED, EXTENDED RELEASE ORAL 2 TIMES DAILY
COMMUNITY

## 2021-01-27 RX ORDER — PEN NEEDLE, DIABETIC 30 GX3/16"
NEEDLE, DISPOSABLE MISCELLANEOUS PRN
COMMUNITY

## 2021-01-27 RX ORDER — OMEPRAZOLE 40 MG/1
40 CAPSULE, DELAYED RELEASE ORAL DAILY
COMMUNITY

## 2021-01-27 RX ORDER — LANCETS 28 GAUGE
1 EACH MISCELLANEOUS DAILY
COMMUNITY

## 2021-08-08 DIAGNOSIS — E83.52 HYPERCALCEMIA: ICD-10-CM

## 2021-08-12 RX ORDER — CINACALCET 30 MG/1
TABLET, FILM COATED ORAL
Qty: 60 TABLET | Refills: 5 | OUTPATIENT
Start: 2021-08-12

## 2021-09-02 DIAGNOSIS — E83.52 HYPERCALCEMIA: ICD-10-CM

## 2021-09-03 RX ORDER — CINACALCET 30 MG/1
TABLET, FILM COATED ORAL
Qty: 60 TABLET | Refills: 5 | OUTPATIENT
Start: 2021-09-03

## 2021-09-11 DIAGNOSIS — E55.9 VITAMIN D DEFICIENCY: ICD-10-CM

## 2021-09-11 DIAGNOSIS — E83.52 HYPERCALCEMIA: ICD-10-CM

## 2021-09-13 RX ORDER — CINACALCET 30 MG/1
TABLET, FILM COATED ORAL
Qty: 60 TABLET | Refills: 5 | Status: SHIPPED
Start: 2021-09-13 | End: 2021-09-20 | Stop reason: SDUPTHER

## 2021-09-13 RX ORDER — ACETAMINOPHEN 160 MG
TABLET,DISINTEGRATING ORAL
Qty: 90 CAPSULE | Refills: 3 | Status: SHIPPED
Start: 2021-09-13 | End: 2022-10-05

## 2021-09-20 ENCOUNTER — OFFICE VISIT (OUTPATIENT)
Dept: ENDOCRINOLOGY | Age: 66
End: 2021-09-20
Payer: MEDICARE

## 2021-09-20 VITALS
HEART RATE: 65 BPM | SYSTOLIC BLOOD PRESSURE: 127 MMHG | HEIGHT: 67 IN | BODY MASS INDEX: 31.86 KG/M2 | OXYGEN SATURATION: 99 % | DIASTOLIC BLOOD PRESSURE: 77 MMHG | WEIGHT: 203 LBS | RESPIRATION RATE: 18 BRPM

## 2021-09-20 DIAGNOSIS — E83.52 HYPERCALCEMIA: ICD-10-CM

## 2021-09-20 DIAGNOSIS — E55.9 VITAMIN D DEFICIENCY: Primary | ICD-10-CM

## 2021-09-20 DIAGNOSIS — E23.6 EMPTY SELLA (HCC): ICD-10-CM

## 2021-09-20 PROCEDURE — 99214 OFFICE O/P EST MOD 30 MIN: CPT | Performed by: INTERNAL MEDICINE

## 2021-09-20 PROCEDURE — 4040F PNEUMOC VAC/ADMIN/RCVD: CPT | Performed by: INTERNAL MEDICINE

## 2021-09-20 PROCEDURE — G8399 PT W/DXA RESULTS DOCUMENT: HCPCS | Performed by: INTERNAL MEDICINE

## 2021-09-20 PROCEDURE — G8427 DOCREV CUR MEDS BY ELIG CLIN: HCPCS | Performed by: INTERNAL MEDICINE

## 2021-09-20 PROCEDURE — 3017F COLORECTAL CA SCREEN DOC REV: CPT | Performed by: INTERNAL MEDICINE

## 2021-09-20 PROCEDURE — G8417 CALC BMI ABV UP PARAM F/U: HCPCS | Performed by: INTERNAL MEDICINE

## 2021-09-20 PROCEDURE — 1036F TOBACCO NON-USER: CPT | Performed by: INTERNAL MEDICINE

## 2021-09-20 PROCEDURE — 1123F ACP DISCUSS/DSCN MKR DOCD: CPT | Performed by: INTERNAL MEDICINE

## 2021-09-20 PROCEDURE — 1090F PRES/ABSN URINE INCON ASSESS: CPT | Performed by: INTERNAL MEDICINE

## 2021-09-20 RX ORDER — CINACALCET 30 MG/1
TABLET, FILM COATED ORAL
Qty: 60 TABLET | Refills: 11 | Status: SHIPPED
Start: 2021-09-20 | End: 2022-04-01

## 2021-09-20 NOTE — PROGRESS NOTES
melena or hematochezia   : Denied any dysuria, hematuria, flank pain, discharge, or incontinence. Skin: denied any rash, ulcer, Hirsute, or hyperpigmentation. MSK: denied any joint deformity, joint pain/swelling, muscle pain, or back pain. Neuro: no numbess, no tingling, no weakness, __  Psychiatric/Behavioral: Negative for sleep disturbance and dysphoric mood. The patient is not nervous/anxious. Objective:   /77   Pulse 65   Resp 18   Ht 5' 7\" (1.702 m)   Wt 203 lb (92.1 kg)   SpO2 99%   BMI 31.79 kg/m²   BP Readings from Last 4 Encounters:   09/20/21 127/77   09/16/20 108/70   11/06/19 136/78   07/23/19 126/74     Wt Readings from Last 6 Encounters:   09/20/21 203 lb (92.1 kg)   09/16/20 211 lb (95.7 kg)   11/06/19 217 lb 3.2 oz (98.5 kg)   07/23/19 213 lb (96.6 kg)   01/23/19 216 lb 12.8 oz (98.3 kg)   11/28/18 207 lb 12.8 oz (94.3 kg)     Physical examination:  Due to this being a TeleHealth encounter, evaluation of the following organ systems is limited: Vitals/Constitutional/EENT/Resp/CV/GI//MS/Neuro/Skin/Heme-Lymph-Imm. Modified physical exam through Telemedicine camera    General: Communicating well via camera   Neck: no obvious neck mass. No obvious neck deformity     CVS: no distress   Chest: no distress. Chest is moving with respiration    Extremities:  no visible tremor  Skin: No visible rashes as seen from camera   Musculoskeletal: no visible deformity  Neuro: Alert and oriented to person, place, and time. Psychiatric: Normal mood and affect.  Behavior is normal    Lab review   I have reviewed the following lab results  Lab Results   Component Value Date/Time     05/05/2020 12:00 PM    K 4.5 05/05/2020 12:00 PM    CO2 24 05/05/2020 12:00 PM    BUN 22 05/05/2020 12:00 PM    CALCIUM 8.7 05/05/2020 12:00 PM      Lab Results   Component Value Date    PTH 43 05/05/2020     08/05/2019     01/23/2019     11/29/2018     No results found for: MG  No results found for: PHOS  Lab Results   Component Value Date    VITD25 38 05/05/2020    VITD25 23 08/05/2019    VITD25 21 01/23/2019    VITD25 12 11/29/2018     No results found for: PTHRP  No results found for: Corina San  No results found for: Ronny Klein  Lab Results   Component Value Date/Time    WBC 6.3 09/06/2017 03:05 AM    RBC 3.98 09/06/2017 03:05 AM    HGB 11.3 (L) 09/06/2017 03:05 AM    HCT 35.4 09/06/2017 03:05 AM    MCV 88.9 09/06/2017 03:05 AM    MCH 28.4 09/06/2017 03:05 AM    MCHC 31.9 (L) 09/06/2017 03:05 AM    RDW 15.1 (H) 09/06/2017 03:05 AM     09/06/2017 03:05 AM    MPV 10.3 09/06/2017 03:05 AM     Lab Results   Component Value Date/Time    GLUCOSE 109 (H) 05/05/2020 12:00 PM    GLUCOSE 249 (H) 08/05/2019 01:38 PM    GLUCOSE 236 (H) 01/23/2019 12:50 PM    GLUCOSE 262 (H) 11/29/2018 09:51 AM     Pituitary MRI 2/2008   There is a 1.7 x 1.2 x 1.2  Pituitary adenoma     Repeated MRI in 5/25/2010  Empty sella variant with some displacement of pituitary gland posterior and inferior due to CSF within the sella. No pituitary micro or macroadenoma. Appearance of sella and pituitary gland is similar to that seen on previous scan from 2/2008     Impression and plan: Rinku Hewitt who is 74 y.o. female in the clinic today for management hyperparathtroidism    Primary hyperparathyroidism (PHPT)   · S/p parathyroid surgery x 2.  First in 2007 at [facility] and second in 7/2019 at [facility] (Rt upper, Rt lower, Lt upper parathyroid gland are resected)   · Calcium level remained after surgery and started on Sensipar   · Currently doing well on Senspar 30 mg BID  · Lab today     vitD deficiency   · Continue vitD supplement     Empty sella syndrome   · MRI in 2008 showed 1.7 cm pituitary adenoma but repeated MRI in 2010 showed only empty sella syndrome and no micro or macroadenoma   · Previous hormonal work up showed intact pituitary function   · Repeat Pituitary function today     I personally reviewed external notes from PCP and other patient's care team providers, and personally interpreted labs associated with the above diagnosis. I also ordered labs to further assess and manage the above addressed medical conditions    Return in about 1 year (around 9/20/2022) for VitD deficiency, Primary hyperparathyroidism empty sella . The above issues were reviewed with the patient who understood and agreed with the plan. Thank you for allowing us to participate in the care of this patient. Please do not hesitate to contact us with any additional questions. Diagnosis Orders   1. Vitamin D deficiency  Basic Metabolic Panel    Vitamin D 25 Hydroxy   2. Hypercalcemia  cinacalcet (SENSIPAR) 30 MG tablet    PTH, Intact    Basic Metabolic Panel    Vitamin D 25 Hydroxy    ALBUMIN   3. Empty sella ([address]  Follicle Stimulating Hormone    Luteinizing Hormone    Prolactin    TSH without Reflex    T4, Free     Juleen Dubin MD  Endocrinologist, [facility])   [address] [place] [address], [address] [place] [address],[facility] [address] [address]   Phone: [phone]  Fax: [phone]  ------------------------------  An electronic signature was used to authenticate this note.  Jae Cheema MD on 9/20/2021 at 1:54 PM

## 2021-09-26 ENCOUNTER — TELEPHONE (OUTPATIENT)
Dept: ENDOCRINOLOGY | Age: 66
End: 2021-09-26

## 2021-09-26 NOTE — TELEPHONE ENCOUNTER
Notify pt,  I have reviewed your recent results    Calcium level was very good, continue current dose of Sensipar     All other results are also good

## 2021-10-12 DIAGNOSIS — U07.1 COVID: ICD-10-CM

## 2021-10-12 RX ORDER — HEPARIN SODIUM (PORCINE) LOCK FLUSH IV SOLN 100 UNIT/ML 100 UNIT/ML
500 SOLUTION INTRAVENOUS PRN
Status: CANCELLED | OUTPATIENT
Start: 2021-10-12

## 2021-10-12 RX ORDER — EPINEPHRINE 1 MG/ML
0.3 INJECTION, SOLUTION, CONCENTRATE INTRAVENOUS PRN
Status: CANCELLED | OUTPATIENT
Start: 2021-10-12

## 2021-10-12 RX ORDER — DIPHENHYDRAMINE HYDROCHLORIDE 50 MG/ML
50 INJECTION INTRAMUSCULAR; INTRAVENOUS ONCE
Status: CANCELLED | OUTPATIENT
Start: 2021-10-12 | End: 2021-10-12

## 2021-10-12 RX ORDER — SODIUM CHLORIDE 9 MG/ML
INJECTION, SOLUTION INTRAVENOUS CONTINUOUS
Status: CANCELLED | OUTPATIENT
Start: 2021-10-12

## 2021-10-12 RX ORDER — SODIUM CHLORIDE 9 MG/ML
25 INJECTION, SOLUTION INTRAVENOUS PRN
Status: CANCELLED | OUTPATIENT
Start: 2021-10-12

## 2021-10-12 RX ORDER — SODIUM CHLORIDE 0.9 % (FLUSH) 0.9 %
5-40 SYRINGE (ML) INJECTION PRN
Status: CANCELLED | OUTPATIENT
Start: 2021-10-12

## 2021-10-12 RX ORDER — METHYLPREDNISOLONE SODIUM SUCCINATE 125 MG/2ML
125 INJECTION, POWDER, LYOPHILIZED, FOR SOLUTION INTRAMUSCULAR; INTRAVENOUS ONCE
Status: CANCELLED | OUTPATIENT
Start: 2021-10-12 | End: 2021-10-12

## 2021-10-13 ENCOUNTER — HOSPITAL ENCOUNTER (OUTPATIENT)
Dept: INFUSION THERAPY | Age: 66
Setting detail: INFUSION SERIES
Discharge: HOME | End: 2021-10-13
Payer: MEDICARE

## 2021-10-13 VITALS
RESPIRATION RATE: 16 BRPM | OXYGEN SATURATION: 98 % | SYSTOLIC BLOOD PRESSURE: 139 MMHG | DIASTOLIC BLOOD PRESSURE: 69 MMHG | TEMPERATURE: 97.1 F | HEART RATE: 55 BPM

## 2021-10-13 DIAGNOSIS — U07.1 COVID: Primary | ICD-10-CM

## 2021-10-13 PROCEDURE — 2580000003 HC RX 258: Performed by: INTERNAL MEDICINE

## 2021-10-13 PROCEDURE — 96365 THER/PROPH/DIAG IV INF INIT: CPT

## 2021-10-13 PROCEDURE — 2500000003 HC RX 250 WO HCPCS: Performed by: INTERNAL MEDICINE

## 2021-10-13 PROCEDURE — 6360000002 HC RX W HCPCS: Performed by: INTERNAL MEDICINE

## 2021-10-13 RX ORDER — DIPHENHYDRAMINE HYDROCHLORIDE 50 MG/ML
50 INJECTION INTRAMUSCULAR; INTRAVENOUS ONCE
Status: CANCELLED | OUTPATIENT
Start: 2021-10-13 | End: 2021-10-13

## 2021-10-13 RX ORDER — SODIUM CHLORIDE 0.9 % (FLUSH) 0.9 %
5-40 SYRINGE (ML) INJECTION PRN
Status: DISCONTINUED | OUTPATIENT
Start: 2021-10-13 | End: 2021-10-14 | Stop reason: HOSPADM

## 2021-10-13 RX ORDER — HEPARIN SODIUM (PORCINE) LOCK FLUSH IV SOLN 100 UNIT/ML 100 UNIT/ML
500 SOLUTION INTRAVENOUS PRN
Status: CANCELLED | OUTPATIENT
Start: 2021-10-13

## 2021-10-13 RX ORDER — EPINEPHRINE 1 MG/ML
0.3 INJECTION, SOLUTION, CONCENTRATE INTRAVENOUS PRN
Status: CANCELLED | OUTPATIENT
Start: 2021-10-13

## 2021-10-13 RX ORDER — METHYLPREDNISOLONE SODIUM SUCCINATE 125 MG/2ML
125 INJECTION, POWDER, LYOPHILIZED, FOR SOLUTION INTRAMUSCULAR; INTRAVENOUS ONCE
Status: CANCELLED | OUTPATIENT
Start: 2021-10-13 | End: 2021-10-13

## 2021-10-13 RX ORDER — SODIUM CHLORIDE 9 MG/ML
25 INJECTION, SOLUTION INTRAVENOUS PRN
Status: CANCELLED | OUTPATIENT
Start: 2021-10-13

## 2021-10-13 RX ORDER — HEPARIN SODIUM (PORCINE) LOCK FLUSH IV SOLN 100 UNIT/ML 100 UNIT/ML
500 SOLUTION INTRAVENOUS PRN
Status: DISCONTINUED | OUTPATIENT
Start: 2021-10-13 | End: 2021-10-14 | Stop reason: HOSPADM

## 2021-10-13 RX ORDER — SODIUM CHLORIDE 9 MG/ML
INJECTION, SOLUTION INTRAVENOUS CONTINUOUS
Status: CANCELLED | OUTPATIENT
Start: 2021-10-13

## 2021-10-13 RX ORDER — SODIUM CHLORIDE 9 MG/ML
25 INJECTION, SOLUTION INTRAVENOUS PRN
Status: DISCONTINUED | OUTPATIENT
Start: 2021-10-13 | End: 2021-10-14 | Stop reason: HOSPADM

## 2021-10-13 RX ORDER — SODIUM CHLORIDE 0.9 % (FLUSH) 0.9 %
5-40 SYRINGE (ML) INJECTION PRN
Status: CANCELLED | OUTPATIENT
Start: 2021-10-13

## 2021-10-13 RX ADMIN — SODIUM CHLORIDE, PRESERVATIVE FREE 10 ML: 5 INJECTION INTRAVENOUS at 08:32

## 2021-10-13 RX ADMIN — SODIUM CHLORIDE 25 ML: 9 INJECTION, SOLUTION INTRAVENOUS at 09:10

## 2021-10-13 RX ADMIN — IMDEVIMAB: 1332 INJECTION, SOLUTION, CONCENTRATE INTRAVENOUS at 08:32

## 2021-10-13 NOTE — PROGRESS NOTES
Patient remained on unit for 1 hour post regen-cov infusion. No complications, d/c in stable condition.

## 2022-03-31 DIAGNOSIS — E83.52 HYPERCALCEMIA: ICD-10-CM

## 2022-04-01 RX ORDER — CINACALCET 30 MG/1
TABLET, FILM COATED ORAL
Qty: 60 TABLET | Refills: 3 | Status: SHIPPED
Start: 2022-04-01 | End: 2022-05-18

## 2022-05-18 DIAGNOSIS — E83.52 HYPERCALCEMIA: ICD-10-CM

## 2022-05-18 RX ORDER — CINACALCET 30 MG/1
TABLET, FILM COATED ORAL
Qty: 120 TABLET | Refills: 3 | Status: SHIPPED | OUTPATIENT
Start: 2022-05-18

## 2022-10-01 DIAGNOSIS — E55.9 VITAMIN D DEFICIENCY: ICD-10-CM

## 2022-10-05 RX ORDER — ACETAMINOPHEN 160 MG
TABLET,DISINTEGRATING ORAL
Qty: 90 CAPSULE | Refills: 3 | Status: SHIPPED | OUTPATIENT
Start: 2022-10-05

## 2022-12-14 DIAGNOSIS — E83.52 HYPERCALCEMIA: ICD-10-CM

## 2022-12-16 RX ORDER — CINACALCET 30 MG/1
TABLET, FILM COATED ORAL
Qty: 120 TABLET | Refills: 3 | Status: SHIPPED | OUTPATIENT
Start: 2022-12-16

## 2022-12-22 ENCOUNTER — OFFICE VISIT (OUTPATIENT)
Dept: HEMATOLOGY | Age: 67
End: 2022-12-22
Payer: MEDICARE

## 2022-12-22 VITALS
RESPIRATION RATE: 16 BRPM | HEIGHT: 67 IN | TEMPERATURE: 97.3 F | HEART RATE: 64 BPM | WEIGHT: 209 LBS | OXYGEN SATURATION: 98 % | DIASTOLIC BLOOD PRESSURE: 63 MMHG | BODY MASS INDEX: 32.8 KG/M2 | SYSTOLIC BLOOD PRESSURE: 143 MMHG

## 2022-12-22 DIAGNOSIS — K83.8 DILATION OF COMMON BILE DUCT: Primary | ICD-10-CM

## 2022-12-22 PROCEDURE — 1123F ACP DISCUSS/DSCN MKR DOCD: CPT | Performed by: TRANSPLANT SURGERY

## 2022-12-22 PROCEDURE — 99204 OFFICE O/P NEW MOD 45 MIN: CPT | Performed by: TRANSPLANT SURGERY

## 2022-12-22 RX ORDER — SPIRONOLACTONE 25 MG/1
25 TABLET ORAL 2 TIMES DAILY
COMMUNITY

## 2022-12-22 ASSESSMENT — ENCOUNTER SYMPTOMS
CONSTIPATION: 0
BLOOD IN STOOL: 0
SHORTNESS OF BREATH: 0
BACK PAIN: 0
EYE PAIN: 0
DIARRHEA: 0
ABDOMINAL PAIN: 0
EYE DISCHARGE: 0
PHOTOPHOBIA: 0
VOMITING: 0
NAUSEA: 0

## 2022-12-22 NOTE — PROGRESS NOTES
Hepatobiliary and Pancreatic Surgery Attending History and Physical    Patient's Name/Date of Birth: Isaiah Kenny /1955 (16 y.o.)    Date: December 22, 2022     CC: abdominal pain    HPI:  Patient is a very pleasant 69year old female who was referred for her CT findings. She states that at the time of her CT scan she was having abdominal pain. It has since resolved. She has never had pancreatitis nor weight loss. In 1989 she had a distal gastrectomy for adenocarcinoma with Dr. Shwetha Johnson. No chemotherapy was needed as it was early. She also has had diabetes since mid 80's.   Her last HbA1c = 6.4    Family history: mother had stomach cancer, cousins with throat cancer    Past Medical History:   Diagnosis Date    Brain tumor ([facility] [address]     Cancer of body of stomach ([facility] [address] [facility]     Diabetes mellitus ([facility] [address] [facility]     Disorder of pituitary gland (HCC)     GERD (gastroesophageal reflux disease)     History of colon polyps     Hypercholesteremia     Hypertension     Type 2 diabetes mellitus without complication ([facility] [address] [facility]     Unspecified cerebral artery occlusion with cerebral infarction        Past Surgical History:   Procedure Laterality Date    APPENDECTOMY      STOMACH SURGERY      cancer       Current Outpatient Medications   Medication Sig Dispense Refill    cinacalcet (SENSIPAR) 30 MG tablet take 1 tablet by mouth twice a day 120 tablet 3    Cholecalciferol (VITAMIN D3) 50 MCG (2000 UT) CAPS take 1 capsule by mouth once daily 90 capsule 3    empagliflozin (JARDIANCE) 25 MG tablet Take 25 mg by mouth daily 1 BY MOUTH EVERY DAY      FreeStyle Lancets MISC 1 each by Does not apply route daily TEST BLOOD GLUCOSE TWICE DAILY      Glucose Blood (FREESTYLE LITE TEST VI) by In Vitro route BLOOD GLUCOSE CHECK 4 TIMES A DAY      Insulin Pen Needle (PEN NEEDLES) 30G X 5 MM MISC by Does not apply route as needed (WITH INSULIN PEN AS NEEDED) WITH INSULIN PEN AS NEEDED      omeprazole (PRILOSEC) 40 MG delayed release capsule Take 40 mg by mouth daily      triamcinolone (KENALOG) 0.1 % cream Apply topically 2 times daily Apply topically 2 times daily. potassium chloride (KLOR-CON M) 20 MEQ TBCR extended release tablet Take 20 mEq by mouth 2 times daily      ibuprofen (ADVIL;MOTRIN) 800 MG tablet Take 800 mg by mouth 3 times daily      oxazepam (SERAX) 30 MG capsule Take 30 mg by mouth nightly as needed for Sleep or Anxiety. insulin glargine (BASAGLAR KWIKPEN) 100 UNIT/ML injection pen 35 units daily 15 pen 5    atenolol (TENORMIN) 100 MG tablet Take 1 tablet by mouth daily 30 tablet 3    allopurinol (ZYLOPRIM) 300 MG tablet Take 300 mg by mouth daily       irbesartan (AVAPRO) 150 MG tablet Take 150 mg by mouth daily      simvastatin (ZOCOR) 40 MG tablet Take 40 mg by mouth nightly. glimepiride (AMARYL) 4 MG tablet Take 4 mg by mouth 2 times daily. amLODIPine (NORVASC) 10 MG tablet Take 10 mg by mouth daily. metformin (GLUCOPHAGE) 500 MG tablet Take 1,000 mg by mouth 2 times daily (with meals)        No current facility-administered medications for this visit.        Allergies   Allergen Reactions    Hydrocodone-Acetaminophen Hives       Family History   Problem Relation Age of Onset    High Blood Pressure Mother     Diabetes Mother     Cancer Mother     Heart Disease Father        Social History     Socioeconomic History    Marital status:      Spouse name: Not on file    Number of children: Not on file    Years of education: Not on file    Highest education level: Not on file   Occupational History    Not on file   Tobacco Use    Smoking status: Former     Types: Cigarettes     Quit date: 1989     Years since quittin.3    Smokeless tobacco: Former     Quit date: 1985   Substance and Sexual Activity    Alcohol use: No    Drug use: No    Sexual activity: Not on file   Other Topics Concern    Not on file   Social History Narrative    Not on file     Social Determinants of Health     Financial Resource Strain: Not on file   Food Insecurity: Not on file   Transportation Needs: Not on file   Physical Activity: Not on file   Stress: Not on file   Social Connections: Not on file   Intimate Partner Violence: Not on file   Housing Stability: Not on file       ROS:   Review of Systems   Constitutional:  Negative for chills, diaphoresis and fever. HENT:  Negative for congestion, ear discharge, ear pain, hearing loss, nosebleeds and tinnitus. Eyes:  Negative for photophobia, pain and discharge. Respiratory:  Negative for shortness of breath. Cardiovascular:  Negative for palpitations and leg swelling. Gastrointestinal:  Negative for abdominal pain, blood in stool, constipation, diarrhea, nausea and vomiting. Endocrine: Negative for polydipsia. Genitourinary:  Negative for frequency, hematuria and urgency. Musculoskeletal:  Negative for back pain and neck pain. Skin:  Negative for rash. Allergic/Immunologic: Negative for environmental allergies. Neurological:  Negative for tremors and seizures. Psychiatric/Behavioral:  Negative for hallucinations and suicidal ideas. The patient is not nervous/anxious. Physical Exam:  BP (!) 143/63   Pulse 64   Temp 97.3 °F (36.3 °C) (Temporal)   Resp 16   Ht 5' 7\" (1.702 m)   Wt 209 lb (94.8 kg)   SpO2 98%   BMI 32.73 kg/m²       PSYCH: mood and affect normal, alert and oriented x 3: No apparent distress, comfortable  EYES: Sclera white, pupils equal round and reactive to light  ENMT:  Hearing normal, trachea midline, ears externally intact  LYMPH: no obvious lympadenopathy in neck. RESP: Respiratory effort was normal with no retractions or use of accessory muscles.   CV:  No pedal edema  GI/ Abdomen: Soft, nondistended, nontender, no guarding, no peritoneal signs  MSK: no clubbing/ no cyanosis/ gaitnormal       Assessment/Plan:  1.1cm Dilated common bile duct (with a gallbladder) and 1cm and 6mm splenic artery aneurysms, history of distal gastrectomy with a B1 anastomosis  - I reviewed their images after our office visit as they were not available at that time  - will plan for an EUS with Dr. Hailey Phillips to evaluate her distal bile duct  - I will see her post EUS  - will need surveillance imaging of her splenic artery aneurysms    45 Minutes of which greater than 50% was spent counseling or coordinating her care. Thank you Dr. Mary Jo Smith for the consultation allowing me to take part in Ms. Dixon's care.      Please send a copy of my note to Dr. Vish Chun      Electronically signed by Dominic Roberto MD on 12/22/2022 at 2:12 PM

## 2023-01-04 ENCOUNTER — INITIAL CONSULT (OUTPATIENT)
Dept: SURGERY | Age: 68
End: 2023-01-04

## 2023-01-04 ENCOUNTER — TELEPHONE (OUTPATIENT)
Dept: SURGERY | Age: 68
End: 2023-01-04

## 2023-01-04 VITALS
BODY MASS INDEX: 32.8 KG/M2 | SYSTOLIC BLOOD PRESSURE: 113 MMHG | HEIGHT: 67 IN | HEART RATE: 67 BPM | WEIGHT: 209 LBS | DIASTOLIC BLOOD PRESSURE: 76 MMHG | TEMPERATURE: 98.1 F

## 2023-01-04 DIAGNOSIS — K83.8 COMMON BILE DUCT DILATATION: Primary | ICD-10-CM

## 2023-01-04 NOTE — TELEPHONE ENCOUNTER
Per the order of Dr. Chloe Mendoza, patient has been scheduled for EUS and possible ERCP on 1.18.2023. Patient provided with procedure information during office visit and will follow up with Dr. Layla Lincoln for results. Patient instructed to please contact our office with any questions. Procedure scheduled through [facility]. Dr. Vikki Finn to enter orders.     Electronically signed by Darinel Brooks on 1/4/23 at 2:15 PM EST

## 2023-01-04 NOTE — PROGRESS NOTES
General Surgery History and Physical  [facility]    Patient's Name/Date of Birth: Ashley Diamond / 1955    Date: January 4, 2023     Surgeon: Bobby Espinal MD    PCP: Amarjit Mosqueda MD     Chief Complaint: Dilated common bile duct    HPI:   Ashley Diamond is a 69 y.o. female who presents for evaluation of dilated common bile duct. She was referred to me by Dr Sohan Perry. This was noted on cross-sectional imaging. The patient does still have a gallbladder however no gallstones were reported. She has a history of gastric cancer status post distal gastrectomy and a B1 reconstruction. There was no evidence of ampullary or pancreatic mass noted on imaging. The patient was referred for an EUS with possible ERCP. Patient Active Problem List   Diagnosis    Hyperparathyroidism ([address]    Hypercalcemia    Vitamin D deficiency    Empty sella syndrome ([address]    COVID    Common bile duct dilatation       Past Medical History:   Diagnosis Date    Brain tumor ([address]     Cancer of body of stomach ([address]     Diabetes mellitus ([address]     Disorder of pituitary gland (HCC)     GERD (gastroesophageal reflux disease)     History of colon polyps     Hypercholesteremia     Hypertension     Type 2 diabetes mellitus without complication ([address]     Unspecified cerebral artery occlusion with cerebral infarction        Past Surgical History:   Procedure Laterality Date    APPENDECTOMY      STOMACH SURGERY      cancer       Allergies   Allergen Reactions    Hydrocodone-Acetaminophen Hives       The patient has a family history that is negative for severe cardiovascular or respiratory issues, negative for reaction to anesthesia. Time spent reviewing past medical, surgical, social and family history, vitals, nursing assessment and images. No changes from above documented history.     Social History     Socioeconomic History    Marital status:      Spouse name: Not on file    Number of children: Not on file    Years of education: Not on file    Highest education level: Not on file   Occupational History    Not on file   Tobacco Use    Smoking status: Former     Types: Cigarettes     Quit date: 1989     Years since quittin.3    Smokeless tobacco: Former     Quit date: 1985   Vaping Use    Vaping Use: Never used   Substance and Sexual Activity    Alcohol use: No    Drug use: No    Sexual activity: Not on file   Other Topics Concern    Not on file   Social History Narrative    Not on file     Social Determinants of Health     Financial Resource Strain: Not on file   Food Insecurity: Not on file   Transportation Needs: Not on file   Physical Activity: Not on file   Stress: Not on file   Social Connections: Not on file   Intimate Partner Violence: Not on file   Housing Stability: Not on file       I have reviewed relevant labs from this admission and interpretation is included in my assessment and plan    Review of Systems    A complete 10 system review was performed and are otherwise negative unless mentioned in the above HPI. Specific negatives are listed below but may not include all those reviewed.     General ROS: negative obtundation, AMS  ENT ROS: negative rhinorrhea, epistaxis  Allergy and Immunology ROS: negative itchy/watery eyes or nasal congestion  Hematological and Lymphatic ROS: negative spontaneous bleeding or bruising  Endocrine ROS: negative  lethargy, mood swings, palpitations or polydipsia/polyuria  Respiratory ROS: negative sputum changes, stridor, tachypnea or wheezing  Cardiovascular ROS: negative for - loss of consciousness, murmur or orthopnea  Gastrointestinal ROS: negative for - hematochezia or hematemesis  Genito-Urinary ROS: negative for -  genital discharge or hematuria  Musculoskeletal ROS: negative for - focal weakness, gangrene  Psych/Neuro ROS: negative for - visual or auditory hallucinations, suicidal ideation    Physical exam:   /76   Pulse 67   Temp 98.1 °F (36.7 °C)   Ht 5' 7\" (1.702 m)   Wt 209 lb (94.8 kg)   BMI 32.73 kg/m²   General appearance:  NAD, appears stated age  Head: NCAT, PERRLA, EOMI, red conjunctiva  Neck: supple, no masses, trachea midline  Lungs: Equal chest rise bilateral, no retractions, no wheezing  Heart: Reg rate  Abdomen: soft, nontender, nondistended  Skin; warm and dry, no cyanosis  Gu: no cva tenderness  Extremities: atraumatic, no focal motor deficits, no open wounds  Psych: No tremor, visual hallucinations      Radiology: N/A    Assessment:  Ace Tran is a 69 y.o. female with dilated common bile duct  Patient Active Problem List   Diagnosis    Hyperparathyroidism ([address]    Hypercalcemia    Vitamin D deficiency    Empty sella syndrome ([address]    COVID    Common bile duct dilatation         Plan:  Dilated common bile duct, history of gastric cancer status post distal gastrectomy with B1 reconstruction, now with abdominal pain    We will proceed with EUS with possible ERCP if choledocholithiasis is encountered at time of EUS  We discussed that due to the patient's altered anatomy, this procedure may not be able to be completed however this would be determined at the time of the procedure  The procedure, risks, benefits and alternatives were discussed with the patient. She agrees to proceed.         Saima Doshi MD  4:22 PM

## 2023-01-04 NOTE — TELEPHONE ENCOUNTER
Prior Authorization Form:      DEMOGRAPHICS:                     Patient Name:  Uma Aguila  Patient :  1955            Insurance:  Payor: Ami Contreras / Plan: Florentino Juarez / Product Type: *No Product type* /   Insurance ID Number:    Payer/Plan Subscr  Sex Relation Sub. Ins. ID Effective Group Num   1.  [id number] 1955 Female Self [id number] [id number]                                    [id number]         DIAGNOSIS & PROCEDURE:                       Procedure/Operation: EUS with possible ERCP           CPT Code: 48604    Diagnosis:  Dilation of CBD    ICD10 Code:     Location:  [address] [place] [address]    Surgeon:  Dave Hendrix INFORMATION:                          Date: 2023     Time: TBD              Anesthesia:  MAC/TIVA                                                       Status:  Outpatient        Special Comments:         Electronically signed by Mai Guajardo on 2023 at 2:15 PM

## 2023-01-13 RX ORDER — SODIUM CHLORIDE 9 MG/ML
INJECTION, SOLUTION INTRAVENOUS CONTINUOUS
Status: CANCELLED | OUTPATIENT
Start: 2023-01-18

## 2023-01-16 ENCOUNTER — HOSPITAL ENCOUNTER (OUTPATIENT)
Dept: PREADMISSION TESTING | Age: 68
Discharge: HOME | End: 2023-01-16
Payer: MEDICARE

## 2023-01-16 VITALS
BODY MASS INDEX: 33.27 KG/M2 | HEART RATE: 54 BPM | HEIGHT: 67 IN | DIASTOLIC BLOOD PRESSURE: 70 MMHG | WEIGHT: 212 LBS | SYSTOLIC BLOOD PRESSURE: 126 MMHG | TEMPERATURE: 97.5 F | RESPIRATION RATE: 16 BRPM

## 2023-01-16 DIAGNOSIS — Z01.818 PREOP TESTING: Primary | ICD-10-CM

## 2023-01-16 LAB
ALBUMIN SERPL-MCNC: 3.8 G/DL (ref 3.5–5.2)
ALP BLD-CCNC: 280 U/L (ref 35–104)
ALT SERPL-CCNC: 8 U/L (ref 0–32)
ANION GAP SERPL CALCULATED.3IONS-SCNC: 9 MMOL/L (ref 7–16)
AST SERPL-CCNC: 15 U/L (ref 0–31)
BASOPHILS ABSOLUTE: 0.02 E9/L (ref 0–0.2)
BASOPHILS RELATIVE PERCENT: 0.4 % (ref 0–2)
BILIRUB SERPL-MCNC: 0.4 MG/DL (ref 0–1.2)
BUN BLDV-MCNC: 30 MG/DL (ref 6–23)
CALCIUM SERPL-MCNC: 8.7 MG/DL (ref 8.6–10.2)
CHLORIDE BLD-SCNC: 104 MMOL/L (ref 98–107)
CO2: 24 MMOL/L (ref 22–29)
CREAT SERPL-MCNC: 1.2 MG/DL (ref 0.5–1)
EKG ATRIAL RATE: 50 BPM
EKG P AXIS: 59 DEGREES
EKG P-R INTERVAL: 202 MS
EKG Q-T INTERVAL: 468 MS
EKG QRS DURATION: 82 MS
EKG QTC CALCULATION (BAZETT): 426 MS
EKG R AXIS: -20 DEGREES
EKG T AXIS: 27 DEGREES
EKG VENTRICULAR RATE: 50 BPM
EOSINOPHILS ABSOLUTE: 0.16 E9/L (ref 0.05–0.5)
EOSINOPHILS RELATIVE PERCENT: 3 % (ref 0–6)
GFR SERPL CREATININE-BSD FRML MDRD: 49 ML/MIN/1.73
GLUCOSE BLD-MCNC: 73 MG/DL (ref 74–99)
HCT VFR BLD CALC: 38.7 % (ref 34–48)
HEMOGLOBIN: 11.6 G/DL (ref 11.5–15.5)
IMMATURE GRANULOCYTES #: 0.02 E9/L
IMMATURE GRANULOCYTES %: 0.4 % (ref 0–5)
LYMPHOCYTES ABSOLUTE: 2.04 E9/L (ref 1.5–4)
LYMPHOCYTES RELATIVE PERCENT: 38.3 % (ref 20–42)
MCH RBC QN AUTO: 27.8 PG (ref 26–35)
MCHC RBC AUTO-ENTMCNC: 30 % (ref 32–34.5)
MCV RBC AUTO: 92.8 FL (ref 80–99.9)
MONOCYTES ABSOLUTE: 0.58 E9/L (ref 0.1–0.95)
MONOCYTES RELATIVE PERCENT: 10.9 % (ref 2–12)
NEUTROPHILS ABSOLUTE: 2.51 E9/L (ref 1.8–7.3)
NEUTROPHILS RELATIVE PERCENT: 47 % (ref 43–80)
PDW BLD-RTO: 15.9 FL (ref 11.5–15)
PLATELET # BLD: 244 E9/L (ref 130–450)
PMV BLD AUTO: 11.3 FL (ref 7–12)
POTASSIUM REFLEX MAGNESIUM: 4 MMOL/L (ref 3.5–5)
RBC # BLD: 4.17 E12/L (ref 3.5–5.5)
SODIUM BLD-SCNC: 137 MMOL/L (ref 132–146)
TOTAL PROTEIN: 7.9 G/DL (ref 6.4–8.3)
WBC # BLD: 5.3 E9/L (ref 4.5–11.5)

## 2023-01-16 PROCEDURE — 80053 COMPREHEN METABOLIC PANEL: CPT

## 2023-01-16 PROCEDURE — 85025 COMPLETE CBC W/AUTO DIFF WBC: CPT

## 2023-01-16 PROCEDURE — 36415 COLL VENOUS BLD VENIPUNCTURE: CPT

## 2023-01-16 NOTE — PROGRESS NOTES
[facility]                                                                                                                    PRE OP INSTRUCTIONS FOR  Royal Flores        Date: 1/16/2023    Date of surgery: 1/18/22   Arrival Time: Hospital will call you between 5pm and 7pm with your final arrival time for surgery    Do not eat or drink anything after midnight prior to surgery. This includes no water, chewing gum, mints or ice chips. Take the following medications with a small sip of water on the morning of Surgery: Atenolo, amlodipine, omeprazole     Diabetics may take evening dose of insulin but none after midnight. If you feel symptomatic or low blood sugar morning of surgery drink 1-2 ounces of apple juice only. Aspirin, Ibuprofen, Advil, Naproxen, Vitamin E and other Anti-inflammatory products should be stopped  before surgery  as directed by your physician. Take Tylenol only unless instructed otherwise by your surgeon. Do not smoke,use illicit drugs and do not drink any alcoholic beverages 24 hours prior to surgery. You may brush your teeth the morning of surgery. DO NOT SWALLOW WATER    You MUST make arrangements for a responsible adult to take you home after your surgery. You will not be allowed to leave alone or drive yourself home. It is strongly suggested someone stay with you the first 24 hrs. Your surgery will be cancelled if you do not have a ride home. Please wear simple, loose fitting clothing to the hospital.  Nancy Poon not bring valuables (money, credit cards, checkbooks, etc.) Do not wear any makeup (including no eye makeup) or nail polish on your fingers or toes. DO NOT wear any jewelry or piercings on day of surgery. All body piercing jewelry must be removed.     Shower the night before surgery with __x_Antibacterial soap /KELIN WIPES________          Notify your Surgeon if you develop any illness between now and surgery time, cough, cold, fever, sore throat, nausea, vomiting, etc.  Please notify your surgeon if you experience dizziness, shortness of breath or blurred vision between now & the time of your surgery.    If you have ___dentures, they will be removed before going to the OR; we will provide you a container. If you wear ___contact lenses or ___glasses, they will be removed; please bring a case for them.    To provide excellent care visitors will be limited to 2 in the room at any given time.                                                                                               During flu season no children under the age of 14 are permitted in the hospital for the safety of all patients.     Other come in main lobby and stop at                  Please call AMBULATORY CARE if you have any further questions.   Pre Admit Testing           [phone]     [facility] [phone]

## 2023-01-17 NOTE — PROGRESS NOTES
CBC, CMP, EKG faxed to Dr Errol Garcia, RN  01/16/23  1430  Labs and EKG also faxed to Dr Zaki Rodriguez.   Elizabeth Ko RN  1/17/2023  ,8:35 AM

## 2023-01-18 ENCOUNTER — ANESTHESIA (OUTPATIENT)
Dept: OPERATING ROOM | Age: 68
End: 2023-01-18
Payer: MEDICARE

## 2023-01-18 ENCOUNTER — APPOINTMENT (OUTPATIENT)
Dept: GENERAL RADIOLOGY | Age: 68
End: 2023-01-18
Attending: SURGERY
Payer: MEDICARE

## 2023-01-18 ENCOUNTER — HOSPITAL ENCOUNTER (OUTPATIENT)
Age: 68
Setting detail: OUTPATIENT SURGERY
Discharge: HOME | End: 2023-01-18
Attending: SURGERY | Admitting: SURGERY
Payer: MEDICARE

## 2023-01-18 ENCOUNTER — ANESTHESIA EVENT (OUTPATIENT)
Dept: OPERATING ROOM | Age: 68
End: 2023-01-18
Payer: MEDICARE

## 2023-01-18 VITALS
HEIGHT: 67 IN | TEMPERATURE: 97 F | DIASTOLIC BLOOD PRESSURE: 74 MMHG | OXYGEN SATURATION: 95 % | WEIGHT: 212 LBS | SYSTOLIC BLOOD PRESSURE: 158 MMHG | HEART RATE: 64 BPM | BODY MASS INDEX: 33.27 KG/M2 | RESPIRATION RATE: 18 BRPM

## 2023-01-18 DIAGNOSIS — K83.8 COMMON BILE DUCT DILATATION: ICD-10-CM

## 2023-01-18 LAB — METER GLUCOSE: 109 MG/DL (ref 74–99)

## 2023-01-18 PROCEDURE — C1753 CATH, INTRAVAS ULTRASOUND: HCPCS | Performed by: SURGERY

## 2023-01-18 PROCEDURE — 3700000001 HC ADD 15 MINUTES (ANESTHESIA): Performed by: SURGERY

## 2023-01-18 PROCEDURE — 7100000011 HC PHASE II RECOVERY - ADDTL 15 MIN: Performed by: SURGERY

## 2023-01-18 PROCEDURE — 7100000010 HC PHASE II RECOVERY - FIRST 15 MIN: Performed by: SURGERY

## 2023-01-18 PROCEDURE — 2500000003 HC RX 250 WO HCPCS

## 2023-01-18 PROCEDURE — 6360000002 HC RX W HCPCS

## 2023-01-18 PROCEDURE — 82962 GLUCOSE BLOOD TEST: CPT

## 2023-01-18 PROCEDURE — 43259 EGD US EXAM DUODENUM/JEJUNUM: CPT | Performed by: SURGERY

## 2023-01-18 PROCEDURE — 2580000003 HC RX 258: Performed by: ANESTHESIOLOGY

## 2023-01-18 PROCEDURE — 3600007503: Performed by: SURGERY

## 2023-01-18 PROCEDURE — 2720000010 HC SURG SUPPLY STERILE: Performed by: SURGERY

## 2023-01-18 PROCEDURE — 3700000000 HC ANESTHESIA ATTENDED CARE: Performed by: SURGERY

## 2023-01-18 PROCEDURE — 6370000000 HC RX 637 (ALT 250 FOR IP): Performed by: SURGERY

## 2023-01-18 PROCEDURE — 6360000004 HC RX CONTRAST MEDICATION: Performed by: SURGERY

## 2023-01-18 PROCEDURE — 74330 X-RAY BILE/PANC ENDOSCOPY: CPT

## 2023-01-18 PROCEDURE — C1769 GUIDE WIRE: HCPCS | Performed by: SURGERY

## 2023-01-18 PROCEDURE — 43277 ERCP EA DUCT/AMPULLA DILATE: CPT | Performed by: SURGERY

## 2023-01-18 PROCEDURE — 3600007513: Performed by: SURGERY

## 2023-01-18 PROCEDURE — 2709999900 HC NON-CHARGEABLE SUPPLY: Performed by: SURGERY

## 2023-01-18 RX ORDER — DIPHENHYDRAMINE HYDROCHLORIDE 50 MG/ML
12.5 INJECTION INTRAMUSCULAR; INTRAVENOUS
Status: DISCONTINUED | OUTPATIENT
Start: 2023-01-18 | End: 2023-01-18 | Stop reason: HOSPADM

## 2023-01-18 RX ORDER — LIDOCAINE HYDROCHLORIDE 20 MG/ML
INJECTION, SOLUTION INTRAVENOUS PRN
Status: DISCONTINUED | OUTPATIENT
Start: 2023-01-18 | End: 2023-01-18 | Stop reason: SDUPTHER

## 2023-01-18 RX ORDER — SODIUM CHLORIDE 0.9 % (FLUSH) 0.9 %
5-40 SYRINGE (ML) INJECTION EVERY 12 HOURS SCHEDULED
Status: DISCONTINUED | OUTPATIENT
Start: 2023-01-18 | End: 2023-01-18 | Stop reason: HOSPADM

## 2023-01-18 RX ORDER — FENTANYL CITRATE 50 UG/ML
INJECTION, SOLUTION INTRAMUSCULAR; INTRAVENOUS PRN
Status: DISCONTINUED | OUTPATIENT
Start: 2023-01-18 | End: 2023-01-18 | Stop reason: SDUPTHER

## 2023-01-18 RX ORDER — MIDAZOLAM HYDROCHLORIDE 1 MG/ML
2 INJECTION INTRAMUSCULAR; INTRAVENOUS
Status: DISCONTINUED | OUTPATIENT
Start: 2023-01-18 | End: 2023-01-18 | Stop reason: HOSPADM

## 2023-01-18 RX ORDER — HYDRALAZINE HYDROCHLORIDE 20 MG/ML
10 INJECTION INTRAMUSCULAR; INTRAVENOUS
Status: DISCONTINUED | OUTPATIENT
Start: 2023-01-18 | End: 2023-01-18 | Stop reason: HOSPADM

## 2023-01-18 RX ORDER — FENTANYL CITRATE 50 UG/ML
25 INJECTION, SOLUTION INTRAMUSCULAR; INTRAVENOUS EVERY 5 MIN PRN
Status: DISCONTINUED | OUTPATIENT
Start: 2023-01-18 | End: 2023-01-18 | Stop reason: HOSPADM

## 2023-01-18 RX ORDER — PROPOFOL 10 MG/ML
INJECTION, EMULSION INTRAVENOUS CONTINUOUS PRN
Status: DISCONTINUED | OUTPATIENT
Start: 2023-01-18 | End: 2023-01-18 | Stop reason: SDUPTHER

## 2023-01-18 RX ORDER — IPRATROPIUM BROMIDE AND ALBUTEROL SULFATE 2.5; .5 MG/3ML; MG/3ML
1 SOLUTION RESPIRATORY (INHALATION)
Status: DISCONTINUED | OUTPATIENT
Start: 2023-01-18 | End: 2023-01-18 | Stop reason: HOSPADM

## 2023-01-18 RX ORDER — SODIUM CHLORIDE 9 MG/ML
25 INJECTION, SOLUTION INTRAVENOUS PRN
Status: DISCONTINUED | OUTPATIENT
Start: 2023-01-18 | End: 2023-01-18 | Stop reason: HOSPADM

## 2023-01-18 RX ORDER — SODIUM CHLORIDE 0.9 % (FLUSH) 0.9 %
5-40 SYRINGE (ML) INJECTION PRN
Status: DISCONTINUED | OUTPATIENT
Start: 2023-01-18 | End: 2023-01-18 | Stop reason: HOSPADM

## 2023-01-18 RX ORDER — FENTANYL CITRATE 50 UG/ML
50 INJECTION, SOLUTION INTRAMUSCULAR; INTRAVENOUS EVERY 5 MIN PRN
Status: DISCONTINUED | OUTPATIENT
Start: 2023-01-18 | End: 2023-01-18 | Stop reason: HOSPADM

## 2023-01-18 RX ORDER — MIDAZOLAM HYDROCHLORIDE 1 MG/ML
INJECTION INTRAMUSCULAR; INTRAVENOUS PRN
Status: DISCONTINUED | OUTPATIENT
Start: 2023-01-18 | End: 2023-01-18 | Stop reason: SDUPTHER

## 2023-01-18 RX ORDER — LABETALOL HYDROCHLORIDE 5 MG/ML
10 INJECTION, SOLUTION INTRAVENOUS
Status: DISCONTINUED | OUTPATIENT
Start: 2023-01-18 | End: 2023-01-18 | Stop reason: HOSPADM

## 2023-01-18 RX ORDER — SODIUM CHLORIDE 9 MG/ML
INJECTION, SOLUTION INTRAVENOUS CONTINUOUS
Status: DISCONTINUED | OUTPATIENT
Start: 2023-01-18 | End: 2023-01-18 | Stop reason: HOSPADM

## 2023-01-18 RX ORDER — GLYCOPYRROLATE 0.2 MG/ML
INJECTION INTRAMUSCULAR; INTRAVENOUS PRN
Status: DISCONTINUED | OUTPATIENT
Start: 2023-01-18 | End: 2023-01-18 | Stop reason: SDUPTHER

## 2023-01-18 RX ADMIN — PROPOFOL INJECTABLE EMULSION 40 MG: 10 INJECTION, EMULSION INTRAVENOUS at 08:23

## 2023-01-18 RX ADMIN — FENTANYL CITRATE 50 MCG: 50 INJECTION, SOLUTION INTRAMUSCULAR; INTRAVENOUS at 08:27

## 2023-01-18 RX ADMIN — LIDOCAINE HYDROCHLORIDE 50 MG: 20 INJECTION, SOLUTION INTRAVENOUS at 08:22

## 2023-01-18 RX ADMIN — FENTANYL CITRATE 50 MCG: 50 INJECTION, SOLUTION INTRAMUSCULAR; INTRAVENOUS at 08:22

## 2023-01-18 RX ADMIN — PROPOFOL INJECTABLE EMULSION 100 MCG/KG/MIN: 10 INJECTION, EMULSION INTRAVENOUS at 08:22

## 2023-01-18 RX ADMIN — SODIUM CHLORIDE: 9 INJECTION, SOLUTION INTRAVENOUS at 07:59

## 2023-01-18 RX ADMIN — GLYCOPYRROLATE 0.2 MG: 0.2 INJECTION INTRAMUSCULAR; INTRAVENOUS at 08:15

## 2023-01-18 RX ADMIN — MIDAZOLAM 2 MG: 1 INJECTION INTRAMUSCULAR; INTRAVENOUS at 08:22

## 2023-01-18 ASSESSMENT — PAIN - FUNCTIONAL ASSESSMENT: PAIN_FUNCTIONAL_ASSESSMENT: 0-10

## 2023-01-18 ASSESSMENT — LIFESTYLE VARIABLES: SMOKING_STATUS: 0

## 2023-01-18 NOTE — OP NOTE
Operative Note      Patient: Madalyn Olmos  YOB: 1955  MRN: [id number]    Date of Procedure: 1/18/2023    Pre-Op Diagnosis: Common bile duct dilatation [K83.8]    Post-Op Diagnosis: Same       Procedure(s):  ERCP SPHINCTER/PAPILLOTOMY  ENDOSCOPIC ULTRASOUND  ERCP DILATION BALLOON    Surgeon(s):  Booker Vera MD    Assistant:   Surgical Assistant: Won Ngo RN    Anesthesia: Monitor Anesthesia Care    Estimated Blood Loss (mL): less than 50     Complications: None    Specimens:   * No specimens in log *    Implants:  * No implants in log *      Drains: * No LDAs found *    Findings: papillary stenosis, no other abnormality    Detailed Description of Procedure: The patient is a 69 y.o. female. The risks, benefits, complications, treatment options and expected outcomes were discussed with the patient. The possibilities of reaction to medication, pulmonary aspiration, perforation of the gastrointestinal tract, bleeding requiring transfusion or operation, respiratory failure requiring placement on a ventilator and failure to diagnose a condition were discussed with the patient who freely signed the consent. Description of Procedure: The patient was taken to the endoscopy suite, identified as Madalyn Olmos and the procedure verified as Endoscopic Ultrasound (EUS). A Time Out was held and the above information confirmed. The patient was positioned in the left lateral position with an oral bite block and anesthesia was provided for sedation and comfort. The echoendoscope was passed to the second portion of the duodenum. EGD/EUS findings:   Esophagus: normal   Stomach: normal.  Distal gastrectomy with B1 anatomy   Duodenum: normal   Pancreas: normal.  Specifically there is no evidence of pancreatic mass. The pancreatic duct is normal in caliber. Bile Duct: Dilated to 11 mm.   No evidence of choledocholithiasis however the distal common bile duct could not be adequately visualized due to the patient's postsurgical anatomy   Gallbladder: Distended. No evidence of sludge or stone on ultrasound      Specimens:  None      ERCP findings    Due to the inability to visualize the distal common bile duct, I elected to perform an ERCP. The flexible duodenoscope was inserted down  the oropharynx and passed down the esophagus into the stomach and into the  duodenum. The papilla was identified. Next, using a sphincterotome with a Jagwire I cannulated the common bile duct. Contrast injection revealed adequate cannulation of the common bile duct. Common bile duct close dilated to 9 to 10 mm. A sphincterotomy was performed with the papillotome. There was no drainage of bile and initial sphincterotomy. A 9 to 12 mm balloon was then passed in the ampulla was noted to be stenotic at this point with some mild difficulty to pass the balloon into the common bile duct. Multiple sweeps were then performed and there was no evidence of stone or debris in the bile duct. I then effectively performed a balloon sphincteroplasty using the extraction balloon for 30 seconds. There was more adequate drainage of bile at this point from the ampulla. No other abnormality was noted. Cholangiogram did not reveal any significantly dilated intrahepatic ducts. There was filling of the cystic duct and the gallbladder consistent with patency. The scope was then withdrawn. The patient tolerated the procedure well.       Electronically signed by Sarita Chowdary MD on 1/18/2023 at 9:12 AM

## 2023-01-18 NOTE — ANESTHESIA PRE PROCEDURE
Department of Anesthesiology  Preprocedure Note       Name:  Peyton Ibrahim   Age:  69 y.o.  :  1955                                          MRN:  [id number]         Date:  2023      Surgeon: Brandon Robledo):  Drew Mercado MD    Procedure: Procedure(s):  ERCP ENDOSCOPIC RETROGRADE CHOLANGIOPANCREATOGRAPHY  EGD W/EUS FNA    Medications prior to admission:   Prior to Admission medications    Medication Sig Start Date End Date Taking? Authorizing Provider   cinacalcet (SENSIPAR) 30 MG tablet take 1 tablet by mouth twice a day  Patient taking differently: Take 30 mg by mouth 2 times daily take 1 tablet by mouth twice a day 22   Major Espinoza MD   Cholecalciferol (VITAMIN D3) 50 MCG ( UT) CAPS take 1 capsule by mouth once daily 10/5/22   Major Espinoza MD   empagliflozin (JARDIANCE) 25 MG tablet Take 25 mg by mouth daily 1 [address] [place] [address]    Historical Provider, MD   FreeStyle Lancets MISC 1 each by Does not apply route daily TEST BLOOD GLUCOSE TWICE DAILY    Historical Provider, MD   Glucose Blood (FREESTYLE LITE TEST VI) by In Vitro route BLOOD GLUCOSE CHECK 4 TIMES A DAY    Historical Provider, MD   Insulin Pen Needle (PEN NEEDLES) 30G X 5 MM MISC by Does not apply route as needed (WITH INSULIN PEN AS NEEDED) WITH INSULIN PEN AS NEEDED    Historical Provider, MD   omeprazole (PRILOSEC) 40 MG delayed release capsule Take 40 mg by mouth daily    Historical Provider, MD   triamcinolone (KENALOG) 0.1 % cream Apply topically 2 times daily Apply topically 2 times daily.     Historical Provider, MD   potassium chloride (KLOR-CON M) 20 MEQ TBCR extended release tablet Take 20 mEq by mouth 2 times daily    Historical Provider, MD   ibuprofen (ADVIL;MOTRIN) 800 MG tablet Take 800 mg by mouth every 8 hours as needed    Historical Provider, MD   insulin glargine (BASAGLAR KWIKPEN) 100 UNIT/ML injection pen 35 units daily  Patient taking differently: Inject 35 Units into the skin every morning 35 units daily 8/10/19   Ankit Brown MD   atenolol (TENORMIN) 100 MG tablet Take 1 tablet by mouth daily 12/5/18   Ankit Brown MD   allopurinol (ZYLOPRIM) 300 MG tablet Take 300 mg by mouth daily  11/1/18   Historical Provider, MD   irbesartan (AVAPRO) 150 MG tablet Take 150 mg by mouth daily    Historical Provider, MD   simvastatin (ZOCOR) 40 MG tablet Take 40 mg by mouth nightly. Historical Provider, MD   amLODIPine (NORVASC) 10 MG tablet Take 10 mg by mouth daily. Historical Provider, MD   metformin (GLUCOPHAGE) 500 MG tablet Take 500 mg by mouth 2 times daily (with meals)    Historical Provider, MD       Current medications:    Current Facility-Administered Medications   Medication Dose Route Frequency Provider Last Rate Last Admin    sodium chloride flush 0.9 % injection 5-40 mL  5-40 mL IntraVENous 2 times per day Buster Roman MD        sodium chloride flush 0.9 % injection 5-40 mL  5-40 mL IntraVENous PRN Buster Roman MD        0.9 % sodium chloride infusion  25 mL IntraVENous PRN Buster Roman MD        0.9 % sodium chloride infusion   IntraVENous Continuous Eugenio Noonan, DO 50 mL/hr at 01/18/23 0759 New Bag at 01/18/23 0759       Allergies:     Allergies   Allergen Reactions    Hydrocodone-Acetaminophen Hives       Problem List:    Patient Active Problem List   Diagnosis Code    Hyperparathyroidism ([facility] E21.3    Hypercalcemia E83.52    Vitamin D deficiency E55.9    Empty sella syndrome (HCC) E23.6    COVID U07.1    Common bile duct dilatation K83.8       Past Medical History:        Diagnosis Date    Brain tumor ([facility]     Cancer of body of stomach ([facility]     Diabetes mellitus ([facility]     Disorder of pituitary gland ([facility]     GERD (gastroesophageal reflux disease)     History of colon polyps     Hypercholesteremia     Hypertension     Type 2 diabetes mellitus without complication (HCC)     Unspecified cerebral artery occlusion with cerebral infarction Past Surgical History:        Procedure Laterality Date    APPENDECTOMY      BLADDER SURGERY      bladder  dropped    HYSTERECTOMY, TOTAL ABDOMINAL (CERVIX REMOVED)      STOMACH SURGERY      cancer       Social History:    Social History     Tobacco Use    Smoking status: Former     Types: Cigarettes     Quit date: 1989     Years since quittin.3    Smokeless tobacco: Never   Substance Use Topics    Alcohol use: No                                Counseling given: Not Answered      Vital Signs (Current):   Vitals:    23 0736 23 0740   BP:  (!) 126/58   Pulse:  59   Resp:  18   Temp:  36.1 °C (96.9 °F)   TempSrc:  Infrared   SpO2:  97%   Weight: 212 lb (96.2 kg)    Height: 5' 7\" (1.702 m)                                               BP Readings from Last 3 Encounters:   23 (!) 126/58   23 126/70   23 113/76       NPO Status: Time of last liquid consumption:                         Time of last solid consumption:                         Date of last liquid consumption: 23                        Date of last solid food consumption: 23    BMI:   Wt Readings from Last 3 Encounters:   23 212 lb (96.2 kg)   23 212 lb (96.2 kg)   23 209 lb (94.8 kg)     Body mass index is 33.2 kg/m².     CBC:   Lab Results   Component Value Date/Time    WBC 5.3 2023 02:30 PM    RBC 4.17 2023 02:30 PM    HGB 11.6 2023 02:30 PM    HCT 38.7 2023 02:30 PM    MCV 92.8 2023 02:30 PM    RDW 15.9 2023 02:30 PM     2023 02:30 PM       CMP:   Lab Results   Component Value Date/Time     2023 02:30 PM    K 4.0 2023 02:30 PM     2023 02:30 PM    CO2 24 2023 02:30 PM    BUN 30 2023 02:30 PM    CREATININE 1.2 2023 02:30 PM    GFRAA 54 2021 02:07 PM    LABGLOM 49 2023 02:30 PM    GLUCOSE 73 2023 02:30 PM    PROT 7.9 2023 02:30 PM    CALCIUM 8.7 01/16/2023 02:30 PM    BILITOT 0.4 01/16/2023 02:30 PM    ALKPHOS 280 01/16/2023 02:30 PM    AST 15 01/16/2023 02:30 PM    ALT 8 01/16/2023 02:30 PM       POC Tests: No results for input(s): POCGLU, POCNA, POCK, POCCL, POCBUN, POCHEMO, POCHCT in the last 72 hours. Coags: No results found for: PROTIME, INR, APTT    HCG (If Applicable): No results found for: PREGTESTUR, PREGSERUM, HCG, HCGQUANT     ABGs: No results found for: PHART, PO2ART, KWR3UDP, XBK5RSJ, BEART, A0EVTBSQ     Type & Screen (If Applicable):  No results found for: LABABO, LABRH    Drug/Infectious Status (If Applicable):  No results found for: HIV, HEPCAB    COVID-19 Screening (If Applicable): No results found for: COVID19        Anesthesia Evaluation  Patient summary reviewed no history of anesthetic complications:   Airway: Mallampati: III  TM distance: >3 FB   Neck ROM: full  Mouth opening: > = 3 FB   Dental:          Pulmonary:Negative Pulmonary ROS breath sounds clear to auscultation      (-) not a current smoker                           Cardiovascular:    (+) hypertension:, hyperlipidemia      ECG reviewed  Rhythm: regular  Rate: normal                    Neuro/Psych:   (+) CVA (no residual symptoms):,              ROS comment: Brain tumor GI/Hepatic/Renal:   (+) GERD:,           Endo/Other:    (+) DiabetesType II DM, no interval change, , malignancy/cancer (hx of stomach cancer s/p stomach surgery). ROS comment: Hx of hyperparathyroidism s/p removal of parathyroid glands Abdominal:   (+) obese,           Vascular: negative vascular ROS. Other Findings:           Anesthesia Plan      MAC     ASA 3       Induction: intravenous. Anesthetic plan and risks discussed with patient. Plan discussed with attending.                     SELENA Patricia - TY   1/18/2023

## 2023-01-18 NOTE — ANESTHESIA POSTPROCEDURE EVALUATION
Department of Anesthesiology  Postprocedure Note    Patient:  Anika Lopez  MRN: [id number]  YOB: 1955  Date of evaluation: 1/18/2023      Procedure Summary     Date: 01/18/23 Room / Location: [address] [place] [address] 644  [address] [facility]    Anesthesia Start: 6955 Anesthesia Stop: 9182    Procedures:       ERCP SPHINCTER/PAPILLOTOMY      ENDOSCOPIC ULTRASOUND      ERCP DILATION BALLOON Diagnosis:       Common bile duct dilatation      (Common bile duct dilatation [K83.8])    Surgeons: Darci Rick MD Responsible Provider: Rin Emanuel MD    Anesthesia Type: MAC ASA Status: 3          Anesthesia Type: MAC    Eren Phase I: Eren Score: 10    Eren Phase II:        Anesthesia Post Evaluation    Patient location during evaluation: PACU  Patient participation: complete - patient participated  Level of consciousness: awake  Airway patency: patent  Nausea & Vomiting: no nausea and no vomiting  Complications: no  Cardiovascular status: hemodynamically stable  Respiratory status: acceptable  Hydration status: euvolemic

## 2023-01-18 NOTE — H&P
General Surgery History and Physical  [facility]    Patient's Name/Date of Birth: Sisi Dillard / 1955    Date: January 18, 2023     Surgeon: Sirena Caraballo MD    PCP: Deon Rendon MD     Chief Complaint: Dilated common bile duct    HPI:   Sisi Dillard is a 69 y.o. female who presents for evaluation of dilated common bile duct. She was referred to me by Dr Keri Hinkle. This was noted on cross-sectional imaging. The patient does still have a gallbladder however no gallstones were reported. She has a history of gastric cancer status post distal gastrectomy and a B1 reconstruction. There was no evidence of ampullary or pancreatic mass noted on imaging. The patient was referred for an EUS with possible ERCP. Patient Active Problem List   Diagnosis    Hyperparathyroidism ([address]    Hypercalcemia    Vitamin D deficiency    Empty sella syndrome ([address]    COVID    Common bile duct dilatation       Past Medical History:   Diagnosis Date    Brain tumor ([address]     Cancer of body of stomach ([address]     Diabetes mellitus ([address]     Disorder of pituitary gland (HCC)     GERD (gastroesophageal reflux disease)     History of colon polyps     Hypercholesteremia     Hypertension     Type 2 diabetes mellitus without complication ([address]     Unspecified cerebral artery occlusion with cerebral infarction        Past Surgical History:   Procedure Laterality Date    APPENDECTOMY      BLADDER SURGERY      bladder  dropped    HYSTERECTOMY, TOTAL ABDOMINAL (CERVIX REMOVED)      STOMACH SURGERY      cancer       Allergies   Allergen Reactions    Hydrocodone-Acetaminophen Hives       The patient has a family history that is negative for severe cardiovascular or respiratory issues, negative for reaction to anesthesia. Time spent reviewing past medical, surgical, social and family history, vitals, nursing assessment and images. No changes from above documented history.     Social History     Socioeconomic History    Marital status:      Spouse name: Not on file    Number of children: Not on file    Years of education: Not on file    Highest education level: Not on file   Occupational History    Not on file   Tobacco Use    Smoking status: Former     Types: Cigarettes     Quit date: 1989     Years since quittin.3    Smokeless tobacco: Never   Vaping Use    Vaping Use: Never used   Substance and Sexual Activity    Alcohol use: No    Drug use: No    Sexual activity: Not on file   Other Topics Concern    Not on file   Social History Narrative    Not on file     Social Determinants of Health     Financial Resource Strain: Not on file   Food Insecurity: Not on file   Transportation Needs: Not on file   Physical Activity: Not on file   Stress: Not on file   Social Connections: Not on file   Intimate Partner Violence: Not on file   Housing Stability: Not on file       I have reviewed relevant labs from this admission and interpretation is included in my assessment and plan    Review of Systems    A complete 10 system review was performed and are otherwise negative unless mentioned in the above HPI. Specific negatives are listed below but may not include all those reviewed.     General ROS: negative obtundation, AMS  ENT ROS: negative rhinorrhea, epistaxis  Allergy and Immunology ROS: negative itchy/watery eyes or nasal congestion  Hematological and Lymphatic ROS: negative spontaneous bleeding or bruising  Endocrine ROS: negative  lethargy, mood swings, palpitations or polydipsia/polyuria  Respiratory ROS: negative sputum changes, stridor, tachypnea or wheezing  Cardiovascular ROS: negative for - loss of consciousness, murmur or orthopnea  Gastrointestinal ROS: negative for - hematochezia or hematemesis  Genito-Urinary ROS: negative for -  genital discharge or hematuria  Musculoskeletal ROS: negative for - focal weakness, gangrene  Psych/Neuro ROS: negative for - visual or auditory hallucinations, suicidal ideation    Physical exam:   BP (!) 126/58   Pulse 59   Temp 96.9 °F (36.1 °C) (Infrared)   Resp 18   Ht 5' 7\" (1.702 m)   Wt 212 lb (96.2 kg)   SpO2 97%   BMI 33.20 kg/m²   General appearance:  NAD, appears stated age  Head: NCAT, PERRLA, EOMI, red conjunctiva  Neck: supple, no masses, trachea midline  Lungs: Equal chest rise bilateral, no retractions, no wheezing  Heart: Reg rate  Abdomen: soft, nontender, nondistended  Skin; warm and dry, no cyanosis  Gu: no cva tenderness  Extremities: atraumatic, no focal motor deficits, no open wounds  Psych: No tremor, visual hallucinations      Radiology: N/A    Assessment:  Harish Martinez is a 69 y.o. female with dilated common bile duct  Patient Active Problem List   Diagnosis    Hyperparathyroidism ([address]    Hypercalcemia    Vitamin D deficiency    Empty sella syndrome ([address]    COVID    Common bile duct dilatation         Plan:  Dilated common bile duct, history of gastric cancer status post distal gastrectomy with B1 reconstruction, now with abdominal pain    We will proceed with EUS with possible ERCP if choledocholithiasis is encountered at time of EUS  We discussed that due to the patient's altered anatomy, this procedure may not be able to be completed however this would be determined at the time of the procedure  The procedure, risks, benefits and alternatives were discussed with the patient. She agrees to proceed.         Sarita Chowdary MD  7:53 AM

## 2023-01-18 NOTE — DISCHARGE INSTRUCTIONS
Endoscopic Retrograde Cholangiopancreatogram (ERCP): What to Expect at [address]  After you have an endoscopic retrograde cholangiopancreatogram (ERCP), you probably will stay at the hospital or clinic for 1 to 2 hours. This will allow the medicine to wear off. You will be able to go home after your doctor or a nurse checks to make sure you are not having any problems. If you stay in the hospital overnight, you may go home the next day. You may have a sore throat for a day or two after the procedure. This care sheet gives you a general idea about how long it will take for you to recover. But each person recovers at a different pace. Follow the steps below to get better as quickly as possible. How can you care for yourself at home? Activity    Rest as much as you need to after you go home. You should be able to go back to your usual activities the day after the procedure. Diet    Follow your doctor's directions for eating after the procedure. Drink plenty of fluids (unless your doctor tells you not to). Medicines    Your doctor will tell you if and when you can restart your medicines. He or she will also give you instructions about taking any new medicines. If you stopped taking aspirin or some other blood thinner, your doctor will tell you when to start taking it again. If you have a sore throat the next day, use an over-the-counter spray to numb your throat. Be safe with medicines. Read and follow all instructions on the label. Follow-up care is a key part of your treatment and safety. Be sure to make and go to all appointments, and call your doctor if you are having problems. It's also a good idea to know your test results and keep a list of the medicines you take. When should you call for help? Call 911 anytime you think you may need emergency care. For example, call if:    You passed out (lost consciousness). Your stools are maroon or very bloody.      You have trouble breathing. Call your doctor now or go to the emergency room if:    You have new or worse belly pain. You have pain that does not get better after you take pain medicine. You have a fever. You cannot pass stools or gas. You are sick to your stomach or cannot hold down fluids. You have blood in your stools. Watch closely for changes in your health, and be sure to contact your doctor if:    Your throat still hurts after a day or two. You do not get better as expected. Where can you learn more? Go to http://www.woods.com/ and enter W326 to learn more about \"Endoscopic Retrograde Cholangiopancreatogram (ERCP): What to Expect at Home. \"  Current as of: June 6, 2022               Content Version: 13.5  © 2006-2022 [employer]. Care instructions adapted under license by [facility] . If you have questions about a medical condition or this instruction, always ask your healthcare professional. [place] [address] any warranty or liability for your use of this information. Endoscopic Ultrasound (Oral): What to Expect At Home  Your Recovery  After you have an endoscopic ultrasound--a test to look for problems in the stomach, liver, gallbladder, and other organs--you will stay at the hospital or clinic for 1 to 2 hours. This will allow the medicine to wear off. You will be able to go home after your doctor or nurse checks to make sure you are not having any problems. You may have a sore throat for a day or two after the test.  This care sheet gives you a general idea about what to expect after the test.  How can you care for yourself at home? Activity    Rest as much as you need to after you go home. You should be able to go back to your usual activities the day after the test.   Diet    Follow your doctor's directions for eating after the test.     Drink plenty of fluids (unless your doctor has told you not to).    Medicines    If you have a sore throat the day after the test, use an over-the-counter spray to numb your throat. Other instructions    Ask your doctor when you can drive again. Do not sign legal documents or make major decisions until the medicine effects are gone and you can think clearly. The anesthesia medicine can make it hard for you to fully understand what you are agreeing to. Follow-up care is a key part of your treatment and safety. Be sure to make and go to all appointments, and call your doctor if you are having problems. It's also a good idea to know your test results and keep a list of the medicines you take. When should you call for help? Call 911 anytime you think you may need emergency care. For example, call if:    You passed out (lost consciousness). You have trouble breathing. Call your doctor now or seek immediate medical care if:    You are vomiting. You have new or worse belly pain. You have a fever. You cannot pass stools or gas. Watch closely for any changes in your health, and be sure to contact your doctor if:    You do not get better as expected. Current as of: June 6, 2022               Content Version: 13.5  © 1374-1085 [employer]. Care instructions adapted under license by [facility] ([facility]). If you have questions about a medical condition or this instruction, always ask your healthcare professional. [address] any warranty or liability for your use of this information.

## 2023-01-26 ENCOUNTER — OFFICE VISIT (OUTPATIENT)
Dept: HEMATOLOGY | Age: 68
End: 2023-01-26
Payer: MEDICARE

## 2023-01-26 VITALS
DIASTOLIC BLOOD PRESSURE: 61 MMHG | HEART RATE: 65 BPM | WEIGHT: 210 LBS | SYSTOLIC BLOOD PRESSURE: 120 MMHG | OXYGEN SATURATION: 99 % | HEIGHT: 67 IN | RESPIRATION RATE: 16 BRPM | TEMPERATURE: 97.4 F | BODY MASS INDEX: 32.96 KG/M2

## 2023-01-26 DIAGNOSIS — K83.8 COMMON BILE DUCT DILATATION: Primary | ICD-10-CM

## 2023-01-26 PROCEDURE — 99212 OFFICE O/P EST SF 10 MIN: CPT | Performed by: TRANSPLANT SURGERY

## 2023-01-26 PROCEDURE — 1123F ACP DISCUSS/DSCN MKR DOCD: CPT | Performed by: TRANSPLANT SURGERY

## 2023-01-26 PROCEDURE — 99213 OFFICE O/P EST LOW 20 MIN: CPT | Performed by: TRANSPLANT SURGERY

## 2023-01-26 NOTE — PROGRESS NOTES
Hepatobiliary and Pancreatic Surgery Attending History and Physical    Patient's Name/Date of Birth: Ace Tran /1955 (89 y.o.)    Date: January 26, 2023     CC: duct dilation    HPI:  Patient is a very pleasant 69year old female who in [facility] she had a distal gastrectomy for adenocarcinoma with Dr. Gilbert Serrano. No chemotherapy was needed as it was early. She also has had diabetes since mid 80's. Her last HbA1c = 6.4. She underwent an EUS with an ERCP. Physical Exam:  /61   Pulse 65   Temp 97.4 °F (36.3 °C) (Temporal)   Resp 16   Ht 5' 7\" (1.702 m)   Wt 210 lb (95.3 kg)   SpO2 99%   BMI 32.89 kg/m²       PSYCH: mood and affect normal, alert and oriented x 3: No apparent distress, comfortable  EYES: Sclera white, pupils equal round and reactive to light  ENMT:  Hearing normal, trachea midline, ears externally intact  LYMPH: no obvious lympadenopathy in neck. RESP: Respiratory effort was normal with no retractions or use of accessory muscles. CV:  No pedal edema  GI/ Abdomen: Soft, nondistended, nontender, no guarding, no peritoneal signs  MSK: no clubbing/ no cyanosis/ gaitnormal       Assessment/Plan:  1.1cm Dilated common bile duct (with a gallbladder) and 1cm and 6mm splenic artery aneurysms, history of distal gastrectomy with a B1 anastomosis  - we discussed that she has benign papillary stenosis  - no need for additional followup. 20 Minutes of which greater than 50% was spent counseling or coordinating her care. Thank you Dr. Annelise Mai for the consultation allowing me to take part in Ms. Dixon's care.      Please send a copy of my note to Dr. Aishwarya Bella      Electronically signed by Isaiah Webster MD on 1/26/2023 at 3:40 PM

## 2023-08-13 DIAGNOSIS — E83.52 HYPERCALCEMIA: ICD-10-CM

## 2023-08-14 RX ORDER — CINACALCET 30 MG/1
TABLET, FILM COATED ORAL
Qty: 120 TABLET | Refills: 3 | OUTPATIENT
Start: 2023-08-14

## 2023-10-05 DIAGNOSIS — E83.52 HYPERCALCEMIA: ICD-10-CM

## 2023-10-06 RX ORDER — CINACALCET 30 MG/1
TABLET, FILM COATED ORAL
Qty: 120 TABLET | Refills: 3 | OUTPATIENT
Start: 2023-10-06

## 2023-11-30 DIAGNOSIS — E55.9 VITAMIN D DEFICIENCY: ICD-10-CM

## 2023-11-30 RX ORDER — ACETAMINOPHEN 160 MG
TABLET,DISINTEGRATING ORAL
Qty: 90 CAPSULE | Refills: 3 | OUTPATIENT
Start: 2023-11-30

## 2023-12-01 RX ORDER — ACETAMINOPHEN 160 MG
1 TABLET,DISINTEGRATING ORAL DAILY
Qty: 90 CAPSULE | Refills: 3 | Status: SHIPPED | OUTPATIENT
Start: 2023-12-01

## 2024-04-11 DIAGNOSIS — E55.9 VITAMIN D DEFICIENCY: ICD-10-CM

## 2024-04-11 DIAGNOSIS — E83.52 HYPERCALCEMIA: ICD-10-CM

## 2024-04-11 RX ORDER — ACETAMINOPHEN 160 MG
2000 TABLET,DISINTEGRATING ORAL DAILY
Qty: 90 CAPSULE | Refills: 3 | Status: SHIPPED | OUTPATIENT
Start: 2024-04-11

## 2024-04-11 RX ORDER — CINACALCET 30 MG/1
30 TABLET, FILM COATED ORAL 2 TIMES DAILY
Qty: 120 TABLET | Refills: 3 | Status: SHIPPED | OUTPATIENT
Start: 2024-04-11

## 2024-05-05 ENCOUNTER — HOSPITAL ENCOUNTER (EMERGENCY)
Age: 69
Discharge: HOME | End: 2024-05-06
Payer: OTHER MISCELLANEOUS

## 2024-05-05 ENCOUNTER — APPOINTMENT (OUTPATIENT)
Dept: CT IMAGING | Age: 69
End: 2024-05-05
Payer: OTHER MISCELLANEOUS

## 2024-05-05 VITALS
BODY MASS INDEX: 31.02 KG/M2 | WEIGHT: 193 LBS | HEART RATE: 74 BPM | DIASTOLIC BLOOD PRESSURE: 64 MMHG | RESPIRATION RATE: 24 BRPM | HEIGHT: 66 IN | SYSTOLIC BLOOD PRESSURE: 113 MMHG | OXYGEN SATURATION: 97 % | TEMPERATURE: 97.8 F
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
## 2024-05-05 DIAGNOSIS — M54.2 NECK PAIN: ICD-10-CM

## 2024-05-05 DIAGNOSIS — S30.1XXA CONTUSION OF ABDOMINAL WALL, INITIAL ENCOUNTER: ICD-10-CM

## 2024-05-05 DIAGNOSIS — S09.90XA CLOSED HEAD INJURY, INITIAL ENCOUNTER: ICD-10-CM

## 2024-05-05 DIAGNOSIS — S16.1XXA ACUTE STRAIN OF NECK MUSCLE, INITIAL ENCOUNTER: Primary | ICD-10-CM

## 2024-05-05 DIAGNOSIS — S20.212A CONTUSION OF LEFT CHEST WALL, INITIAL ENCOUNTER: ICD-10-CM

## 2024-05-05 LAB
ALBUMIN SERPL-MCNC: 3.7 G/DL (ref 3.5–5.2)
ALP SERPL-CCNC: 180 U/L (ref 35–104)
ALT SERPL-CCNC: 9 U/L (ref 0–32)
ANION GAP SERPL CALCULATED.3IONS-SCNC: 11 MMOL/L (ref 7–16)
AST SERPL-CCNC: 17 U/L (ref 0–31)
BASOPHILS # BLD: 0.04 K/UL (ref 0–0.2)
BILIRUB SERPL-MCNC: 0.3 MG/DL (ref 0–1.2)
BUN SERPL-MCNC: 25 MG/DL (ref 6–23)
CALCIUM SERPL-MCNC: 9.7 MG/DL (ref 8.6–10.2)
CHLORIDE SERPL-SCNC: 106 MMOL/L (ref 98–107)
CO2 SERPL-SCNC: 22 MMOL/L (ref 22–29)
CREAT SERPL-MCNC: 1.2 MG/DL (ref 0.5–1)
EOSINOPHIL # BLD: 0.13 K/UL (ref 0.05–0.5)
EOSINOPHILS RELATIVE PERCENT: 2 % (ref 0–6)
ERYTHROCYTE [DISTWIDTH] IN BLOOD BY AUTOMATED COUNT: 15.8 % (ref 11.5–15)
GFR, ESTIMATED: 51 ML/MIN/1.73M2
GLUCOSE SERPL-MCNC: 105 MG/DL (ref 74–99)
HCT VFR BLD AUTO: 33.2 % (ref 34–48)
HGB BLD-MCNC: 10.9 G/DL (ref 11.5–15.5)
IMM GRANULOCYTES # BLD AUTO: 0.04 K/UL (ref 0–0.58)
IMM GRANULOCYTES NFR BLD: 1 % (ref 0–5)
INR PPP: 1.2
LYMPHOCYTES NFR BLD: 2.42 K/UL (ref 1.5–4)
LYMPHOCYTES RELATIVE PERCENT: 42 % (ref 20–42)
MCH RBC QN AUTO: 31 PG (ref 26–35)
MCHC RBC AUTO-ENTMCNC: 32.8 G/DL (ref 32–34.5)
MCV RBC AUTO: 94.3 FL (ref 80–99.9)
MONOCYTES NFR BLD: 0.44 K/UL (ref 0.1–0.95)
MONOCYTES NFR BLD: 8 % (ref 2–12)
NEUTROPHILS NFR BLD: 47 % (ref 43–80)
NEUTS SEG NFR BLD: 2.7 K/UL (ref 1.8–7.3)
PARTIAL THROMBOPLASTIN TIME: 38.3 SEC (ref 24.5–35.1)
PLATELET # BLD AUTO: 280 K/UL (ref 130–450)
PMV BLD AUTO: 10.3 FL (ref 7–12)
POTASSIUM SERPL-SCNC: 4.2 MMOL/L (ref 3.5–5)
PROT SERPL-MCNC: 8 G/DL (ref 6.4–8.3)
PROTHROMBIN TIME: 13.4 SEC (ref 9.3–12.4)
RBC # BLD AUTO: 3.52 M/UL (ref 3.5–5.5)
SODIUM SERPL-SCNC: 139 MMOL/L (ref 132–146)
TROPONIN I SERPL HS-MCNC: 8 NG/L (ref 0–9)
TROPONIN I SERPL HS-MCNC: 9 NG/L (ref 0–9)
WBC OTHER # BLD: 5.8 K/UL (ref 4.5–11.5)

## 2024-05-05 PROCEDURE — 6360000004 HC RX CONTRAST MEDICATION: Performed by: RADIOLOGY

## 2024-05-05 PROCEDURE — 93005 ELECTROCARDIOGRAM TRACING: CPT | Performed by: NURSE PRACTITIONER

## 2024-05-05 PROCEDURE — 71260 CT THORAX DX C+: CPT

## 2024-05-05 PROCEDURE — 99285 EMERGENCY DEPT VISIT HI MDM: CPT

## 2024-05-05 PROCEDURE — 80053 COMPREHEN METABOLIC PANEL: CPT

## 2024-05-05 PROCEDURE — 85025 COMPLETE CBC W/AUTO DIFF WBC: CPT

## 2024-05-05 PROCEDURE — 74177 CT ABD & PELVIS W/CONTRAST: CPT

## 2024-05-05 PROCEDURE — 85730 THROMBOPLASTIN TIME PARTIAL: CPT

## 2024-05-05 PROCEDURE — 85610 PROTHROMBIN TIME: CPT

## 2024-05-05 PROCEDURE — 72125 CT NECK SPINE W/O DYE: CPT

## 2024-05-05 PROCEDURE — 72131 CT LUMBAR SPINE W/O DYE: CPT

## 2024-05-05 PROCEDURE — 2580000003 HC RX 258: Performed by: NURSE PRACTITIONER

## 2024-05-05 PROCEDURE — 70450 CT HEAD/BRAIN W/O DYE: CPT

## 2024-05-05 PROCEDURE — 84484 ASSAY OF TROPONIN QUANT: CPT

## 2024-05-05 RX ORDER — ACETAMINOPHEN 500 MG
500 TABLET ORAL 4 TIMES DAILY PRN
Qty: 20 TABLET | Refills: 0 | Status: SHIPPED | OUTPATIENT
Start: 2024-05-05

## 2024-05-05 RX ORDER — LIDOCAINE 50 MG/G
1 PATCH TOPICAL DAILY
Qty: 10 PATCH | Refills: 0 | Status: SHIPPED | OUTPATIENT
Start: 2024-05-05 | End: 2024-05-15

## 2024-05-05 RX ORDER — 0.9 % SODIUM CHLORIDE 0.9 %
500 INTRAVENOUS SOLUTION INTRAVENOUS ONCE
Status: COMPLETED | OUTPATIENT
Start: 2024-05-05 | End: 2024-05-05

## 2024-05-05 RX ADMIN — SODIUM CHLORIDE 500 ML: 9 INJECTION, SOLUTION INTRAVENOUS at 19:56

## 2024-05-05 RX ADMIN — IOPAMIDOL 75 ML: 755 INJECTION, SOLUTION INTRAVENOUS at 20:51

## 2024-05-05 ASSESSMENT — PAIN DESCRIPTION - ONSET: ONSET: ON-GOING

## 2024-05-05 ASSESSMENT — PAIN DESCRIPTION - FREQUENCY: FREQUENCY: CONTINUOUS

## 2024-05-05 ASSESSMENT — LIFESTYLE VARIABLES
HOW MANY STANDARD DRINKS CONTAINING ALCOHOL DO YOU HAVE ON A TYPICAL DAY: PATIENT DOES NOT DRINK
HOW OFTEN DO YOU HAVE A DRINK CONTAINING ALCOHOL: NEVER

## 2024-05-05 ASSESSMENT — PAIN DESCRIPTION - DESCRIPTORS: DESCRIPTORS: ACHING

## 2024-05-05 ASSESSMENT — PAIN DESCRIPTION - ORIENTATION: ORIENTATION: LEFT

## 2024-05-05 ASSESSMENT — PAIN - FUNCTIONAL ASSESSMENT
PAIN_FUNCTIONAL_ASSESSMENT: 0-10
PAIN_FUNCTIONAL_ASSESSMENT: PREVENTS OR INTERFERES SOME ACTIVE ACTIVITIES AND ADLS

## 2024-05-05 ASSESSMENT — PAIN DESCRIPTION - PAIN TYPE: TYPE: ACUTE PAIN

## 2024-05-05 ASSESSMENT — PAIN DESCRIPTION - LOCATION: LOCATION: HEAD;BACK;HIP

## 2024-05-05 ASSESSMENT — PAIN SCALES - GENERAL: PAINLEVEL_OUTOF10: 10
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
## 2024-05-06 LAB
EKG ATRIAL RATE: 68 BPM
EKG P AXIS: 66 DEGREES
EKG P-R INTERVAL: 208 MS
EKG Q-T INTERVAL: 382 MS
EKG QRS DURATION: 80 MS
EKG QTC CALCULATION (BAZETT): 406 MS
EKG R AXIS: -14 DEGREES
EKG T AXIS: 78 DEGREES
EKG VENTRICULAR RATE: 68 BPM

## 2024-05-06 ASSESSMENT — PAIN - FUNCTIONAL ASSESSMENT: PAIN_FUNCTIONAL_ASSESSMENT: NONE - DENIES PAIN

## 2024-08-09 ENCOUNTER — HOSPITAL ENCOUNTER (EMERGENCY)
Age: 69
Discharge: HOME | End: 2024-08-09
Payer: OTHER MISCELLANEOUS

## 2024-08-09 ENCOUNTER — APPOINTMENT (OUTPATIENT)
Dept: GENERAL RADIOLOGY | Age: 69
End: 2024-08-09
Payer: OTHER MISCELLANEOUS

## 2024-08-09 ENCOUNTER — APPOINTMENT (OUTPATIENT)
Dept: CT IMAGING | Age: 69
End: 2024-08-09
Payer: OTHER MISCELLANEOUS

## 2024-08-09 VITALS
RESPIRATION RATE: 15 BRPM | BODY MASS INDEX: 25.39 KG/M2 | HEART RATE: 76 BPM | OXYGEN SATURATION: 99 % | TEMPERATURE: 97.7 F | HEIGHT: 66 IN | SYSTOLIC BLOOD PRESSURE: 121 MMHG | DIASTOLIC BLOOD PRESSURE: 67 MMHG | WEIGHT: 158 LBS

## 2024-08-09 DIAGNOSIS — S39.012A STRAIN OF LUMBAR REGION, INITIAL ENCOUNTER: ICD-10-CM
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
## 2024-08-09 DIAGNOSIS — S16.1XXA STRAIN OF NECK MUSCLE, INITIAL ENCOUNTER: ICD-10-CM

## 2024-08-09 PROCEDURE — 6370000000 HC RX 637 (ALT 250 FOR IP): Performed by: PHYSICIAN ASSISTANT

## 2024-08-09 PROCEDURE — 72100 X-RAY EXAM L-S SPINE 2/3 VWS: CPT

## 2024-08-09 PROCEDURE — 70450 CT HEAD/BRAIN W/O DYE: CPT

## 2024-08-09 PROCEDURE — 72125 CT NECK SPINE W/O DYE: CPT

## 2024-08-09 PROCEDURE — 99284 EMERGENCY DEPT VISIT MOD MDM: CPT

## 2024-08-09 PROCEDURE — 72072 X-RAY EXAM THORAC SPINE 3VWS: CPT

## 2024-08-09 RX ORDER — OXYCODONE HYDROCHLORIDE AND ACETAMINOPHEN 5; 325 MG/1; MG/1
1 TABLET ORAL ONCE
Status: COMPLETED | OUTPATIENT
Start: 2024-08-09 | End: 2024-08-09

## 2024-08-09 RX ORDER — CYCLOBENZAPRINE HCL 10 MG
10 TABLET ORAL 3 TIMES DAILY PRN
Qty: 21 TABLET | Refills: 0 | Status: SHIPPED | OUTPATIENT
Start: 2024-08-09 | End: 2024-08-19

## 2024-08-09 RX ORDER — IBUPROFEN 600 MG/1
600 TABLET ORAL 3 TIMES DAILY PRN
Qty: 30 TABLET | Refills: 0 | Status: SHIPPED | OUTPATIENT
Start: 2024-08-09

## 2024-08-09 RX ADMIN — OXYCODONE HYDROCHLORIDE AND ACETAMINOPHEN 1 TABLET: 5; 325 TABLET ORAL at 18:49

## 2024-08-09 ASSESSMENT — PAIN DESCRIPTION - LOCATION
LOCATION: NECK;HEAD
LOCATION: SHOULDER;BACK

## 2024-08-09 ASSESSMENT — PAIN - FUNCTIONAL ASSESSMENT: PAIN_FUNCTIONAL_ASSESSMENT: 0-10

## 2024-08-09 ASSESSMENT — LIFESTYLE VARIABLES
HOW OFTEN DO YOU HAVE A DRINK CONTAINING ALCOHOL: NEVER
HOW MANY STANDARD DRINKS CONTAINING ALCOHOL DO YOU HAVE ON A TYPICAL DAY: PATIENT DOES NOT DRINK

## 2024-08-09 ASSESSMENT — PAIN DESCRIPTION - DESCRIPTORS: DESCRIPTORS: ACHING

## 2024-08-09 ASSESSMENT — PAIN SCALES - GENERAL: PAINLEVEL_OUTOF10: 9

## 2024-08-09 ASSESSMENT — PAIN DESCRIPTION - ORIENTATION: ORIENTATION: RIGHT
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
## 2024-08-09 NOTE — DISCHARGE INSTR - COC
Continuity of Care Form    Patient Name: Hanna Dixon   :  1955  MRN:  [id number]    Admit date:  2024  Discharge date:  ***    Code Status Order: Prior   Advance Directives:     Admitting Physician:  No admitting provider for patient encounter.  PCP: Luc Benz MD    Discharging Nurse: ***  Discharging Hospital Unit/Room#: SGWR/SG-WR  Discharging Unit Phone Number: ***    Emergency Contact:   Extended Emergency Contact Information  Primary Emergency Contact: Julisa Irving   [facility]  Home Phone: [phone]  Relation: Child  Secondary Emergency Contact: Sandoval Daniels   [facility]  Home Phone: [phone]  Relation: Grandchild    Past Surgical History:  Past Surgical History:   Procedure Laterality Date    APPENDECTOMY      BLADDER SURGERY      bladder  dropped    ERCP N/A 2023    ERCP SPHINCTER/PAPILLOTOMY performed by Arcenio Ramirez MD at [facility] OR    ERCP N/A 2023    ERCP DILATION BALLOON performed by Arcenio Ramirez MD at [facility] OR    HYSTERECTOMY, TOTAL ABDOMINAL (CERVIX REMOVED)      STOMACH SURGERY      cancer    UPPER GASTROINTESTINAL ENDOSCOPY  2023    ENDOSCOPIC ULTRASOUND performed by Arcenio Ramirez MD at [facility] OR       Immunization History:   Immunization History   Administered Date(s) Administered    COVID-19, MODERNA BLUE border, Primary or Immunocompromised, (age 12y+), IM, 100 mcg/0.5mL 2021, 2021       Active Problems:  Patient Active Problem List   Diagnosis Code    Hyperparathyroidism (HCC) E21.3    Hypercalcemia E83.52    Vitamin D deficiency E55.9    Empty sella syndrome (HCC) E23.6    COVID U07.1    Common bile duct dilatation K83.8       Isolation/Infection:   Isolation            No Isolation          Patient Infection Status       None to display            Nurse Assessment:  Last Vital Signs: /67   Pulse 76   Temp 97.7 °F (36.5 °C) (Oral)   Resp 15   Ht 1.676 m (5' 6\")   Wt 71.7 kg (158 lb)    SpO2 99%   BMI 25.50 kg/m²     Last documented pain score (0-10 scale): Pain Level: 9  Last Weight:   Wt Readings from Last 1 Encounters:   24 71.7 kg (158 lb)     Mental Status:  {IP PT MENTAL STATUS:}    IV Access:  { RAMIREZ IV ACCESS:381382475}    Nursing Mobility/ADLs:  Walking   {CHP DME ADLs:205089489}  Transfer  {CHP DME ADLs:915762358}  Bathing  {CHP DME ADLs:702295865}  Dressing  {CHP DME ADLs:239314188}  Toileting  {CHP DME ADLs:914170448}  Feeding  {CHP DME ADLs:951780854}  Med Admin  {CHP DME ADLs:917127122}  Med Delivery   { RAMIREZ MED Delivery:418463511}    Wound Care Documentation and Therapy:        Elimination:  Continence:   Bowel: {YES / NO:}  Bladder: {YES / NO:}  Urinary Catheter: {Urinary Catheter:294275097}   Colostomy/Ileostomy/Ileal Conduit: {YES / NO:}       Date of Last BM: ***  No intake or output data in the 24 hours ending 24  No intake/output data recorded.    Safety Concerns:     { RAMIREZ Safety Concerns:665691924}    Impairments/Disabilities:      { RAMIREZ Impairments/Disabilities:249717534}    Nutrition Therapy:  Current Nutrition Therapy:   { RAMIREZ Diet List:360584107}    Routes of Feeding: {CHP DME Other Feedings:637638919}  Liquids: {Slp liquid thickness:24126}  Daily Fluid Restriction: {CHP DME Yes amt example:364886635}  Last Modified Barium Swallow with Video (Video Swallowing Test): {Done Not Done Date:}    Treatments at the Time of Hospital Discharge:   Respiratory Treatments: ***  Oxygen Therapy:  {Therapy; copd oxygen:49399}  Ventilator:    { CC Vent List:199995516}    Rehab Therapies: {THERAPEUTIC INTERVENTION:7433091782}  Weight Bearing Status/Restrictions: {[facility] Weight Bearin}  Other Medical Equipment (for information only, NOT a DME order):  {EQUIPMENT:829252191}  Other Treatments: ***    Patient's personal belongings (please select all that are sent with patient):  {ZULEYKA YAN Belongings:762818367}    ALLAN

## 2024-12-17 DIAGNOSIS — E83.52 HYPERCALCEMIA: ICD-10-CM

## 2024-12-17 RX ORDER — CINACALCET 30 MG/1
30 TABLET, FILM COATED ORAL 2 TIMES DAILY
Qty: 120 TABLET | Refills: 3 | OUTPATIENT
Start: 2024-12-17

## 2025-02-21 DIAGNOSIS — E83.52 HYPERCALCEMIA: ICD-10-CM

## 2025-02-21 RX ORDER — EMPAGLIFLOZIN 25 MG/1
25 TABLET, FILM COATED ORAL DAILY
Qty: 90 TABLET | OUTPATIENT
Start: 2025-02-21

## 2025-02-21 RX ORDER — CINACALCET 30 MG/1
30 TABLET, FILM COATED ORAL 2 TIMES DAILY
Qty: 60 TABLET | Refills: 0 | Status: SHIPPED | OUTPATIENT
Start: 2025-02-21

## 2025-03-18 ENCOUNTER — OFFICE VISIT (OUTPATIENT)
Dept: ENDOCRINOLOGY | Age: 70
End: 2025-03-18
Payer: MEDICARE

## 2025-03-18 VITALS
OXYGEN SATURATION: 100 % | WEIGHT: 172 LBS | RESPIRATION RATE: 18 BRPM | DIASTOLIC BLOOD PRESSURE: 69 MMHG | SYSTOLIC BLOOD PRESSURE: 105 MMHG | BODY MASS INDEX: 27 KG/M2 | HEART RATE: 64 BPM | TEMPERATURE: 98.4 F | HEIGHT: 67 IN

## 2025-03-18 DIAGNOSIS — E55.9 VITAMIN D DEFICIENCY: ICD-10-CM

## 2025-03-18 DIAGNOSIS — E83.52 HYPERCALCEMIA: ICD-10-CM

## 2025-03-18 DIAGNOSIS — E23.6 EMPTY SELLA: Primary | ICD-10-CM

## 2025-03-18 PROCEDURE — G8419 CALC BMI OUT NRM PARAM NOF/U: HCPCS | Performed by: INTERNAL MEDICINE

## 2025-03-18 PROCEDURE — G8399 PT W/DXA RESULTS DOCUMENT: HCPCS | Performed by: INTERNAL MEDICINE

## 2025-03-18 PROCEDURE — 3017F COLORECTAL CA SCREEN DOC REV: CPT | Performed by: INTERNAL MEDICINE

## 2025-03-18 PROCEDURE — 99214 OFFICE O/P EST MOD 30 MIN: CPT | Performed by: INTERNAL MEDICINE

## 2025-03-18 PROCEDURE — 1036F TOBACCO NON-USER: CPT | Performed by: INTERNAL MEDICINE

## 2025-03-18 PROCEDURE — G2211 COMPLEX E/M VISIT ADD ON: HCPCS | Performed by: INTERNAL MEDICINE

## 2025-03-18 PROCEDURE — 1090F PRES/ABSN URINE INCON ASSESS: CPT | Performed by: INTERNAL MEDICINE

## 2025-03-18 PROCEDURE — G8428 CUR MEDS NOT DOCUMENT: HCPCS | Performed by: INTERNAL MEDICINE

## 2025-03-18 PROCEDURE — 1123F ACP DISCUSS/DSCN MKR DOCD: CPT | Performed by: INTERNAL MEDICINE

## 2025-03-18 RX ORDER — CINACALCET 30 MG/1
30 TABLET, FILM COATED ORAL 2 TIMES DAILY
Qty: 180 TABLET | Refills: 3 | Status: SHIPPED | OUTPATIENT
Start: 2025-03-18

## 2025-03-18 RX ORDER — ACETAMINOPHEN 160 MG
2000 TABLET,DISINTEGRATING ORAL DAILY
Qty: 90 CAPSULE | Refills: 3 | Status: SHIPPED | OUTPATIENT
Start: 2025-03-18

## 2025-03-18 NOTE — PROGRESS NOTES
[employer]  [facility] Department of Endocrinology Diabetes and Metabolism   [address] [place] [address]   Phone: [phone]  Fax: [phone]    Date of Service: 3/18/25  Primary Care Physician: Luc Benz MD.  Provider: Wilberto Hull MD       Reason for the visit:  Primary hyperparathyroidism , Empty sella syndrome     HPI  The history is provided by the patient. No  was used. Accuracy of the patient data is excellent.  Hanna Dixon is a very pleasant 69 y.o. female seen for follow up visit on primary hyperparathyroidism and empty sella syndrome      The patient was diagnosed with hypercalcemia and primary hyperparathyroidism many years ago   She underwent surgical resection of Rt lower parathyroid gland in 2007 at [facility] but remained hypercalcemic     Because of sever hypercalcemia (Ca 11.3-11.9) with high normal 24hr urine calcium she underwent another surgical resection to left upper and right upper parathyroid glands on 3/6/19 at [facility] by Dr. Smith. On the morning following surgery, her serum calcium measured 11.5 with a corresponding PTH of 58 indicating persistence primary hyperparathyroidism     Repeated labs 3/26/2019 - PTH 82, Ca 11.9     24 hr urine calcium 1/20/2018 was 267 mg/24hr      DXA scan 12/21/2018 was normal   LP T score was  2.0   Total Hip  T score 0.5     Pt currently on Sensipar 30 mg BID. Recent labs showed normal Ca and PTH     5/5/2020   Calcium 8.7   PTH 43       Thyroid US --> small 8 mm hypoechoic left thyroid nodule     Regarding Empty sella syndrome   The patient was diagnosed with pituitary macroadenoma in 2008   Pituitary MRI 2/2008  --> There is a 1.7 x 1.2 x 1.2 cm Pituitary adenoma     Repeated MRI in 5/25/2010  Empty sella variant with some displacement of pituitary gland posterior and inferior due to CSF within the sella. No pituitary micro or macroadenoma. Appearance of sella and

## 2025-03-26 LAB
ALBUMIN: NORMAL
ALP BLD-CCNC: NORMAL U/L
ALT SERPL-CCNC: NORMAL U/L
ANION GAP SERPL CALCULATED.3IONS-SCNC: NORMAL MMOL/L
AST SERPL-CCNC: NORMAL U/L
BILIRUB SERPL-MCNC: NORMAL MG/DL
BUN BLDV-MCNC: NORMAL MG/DL
CALCIUM SERPL-MCNC: NORMAL MG/DL
CHLORIDE BLD-SCNC: NORMAL MMOL/L
CO2: NORMAL
CORTISOL: NORMAL
CREAT SERPL-MCNC: NORMAL MG/DL
GFR, ESTIMATED: NORMAL
GLUCOSE BLD-MCNC: NORMAL MG/DL
POTASSIUM SERPL-SCNC: NORMAL MMOL/L
PROLACTIN: NORMAL
PTH INTACT: NORMAL
SODIUM BLD-SCNC: NORMAL MMOL/L
T4 FREE: NORMAL
T4 TOTAL: NORMAL
TOTAL PROTEIN: NORMAL
TSH SERPL DL<=0.05 MIU/L-ACNC: NORMAL M[IU]/L
VITAMIN D 25-HYDROXY: NORMAL
VITAMIN D2, 25 HYDROXY: NORMAL
VITAMIN D3,25 HYDROXY: NORMAL

## 2025-03-27 DIAGNOSIS — E83.52 HYPERCALCEMIA: ICD-10-CM

## 2025-03-27 DIAGNOSIS — E23.6 EMPTY SELLA: ICD-10-CM

## 2025-03-27 DIAGNOSIS — E55.9 VITAMIN D DEFICIENCY: ICD-10-CM

## 2025-03-31 ENCOUNTER — RESULTS FOLLOW-UP (OUTPATIENT)
Dept: ENDOCRINOLOGY | Age: 70
End: 2025-03-31

## 2025-03-31 DIAGNOSIS — E03.8 CENTRAL HYPOTHYROIDISM: ICD-10-CM

## 2025-03-31 DIAGNOSIS — E23.7 PITUITARY DYSFUNCTION: Primary | ICD-10-CM

## 2025-03-31 RX ORDER — LEVOTHYROXINE SODIUM 25 UG/1
25 TABLET ORAL DAILY
Qty: 30 TABLET | Refills: 5 | Status: SHIPPED | OUTPATIENT
Start: 2025-03-31

## 2025-04-01 NOTE — TELEPHONE ENCOUNTER
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  To rule out  pituitary lesion we will need pituitary MRI.    Will also start levothyroxine 25 mcg daily.  Please make sure to take your thyroid medication first thing in the morning, on empty stomach, 1 hour before breakfast and at least 4 to 5 hours away from any calcium/iron/multivitamins containing calcium and iron

## 2025-04-25 RX ORDER — EMPAGLIFLOZIN 25 MG/1
25 TABLET, FILM COATED ORAL DAILY
Qty: 30 TABLET | Refills: 0 | Status: SHIPPED | OUTPATIENT
Start: 2025-04-25

## 2025-05-27 RX ORDER — EMPAGLIFLOZIN 25 MG/1
25 TABLET, FILM COATED ORAL DAILY
Qty: 30 TABLET | Refills: 0 | OUTPATIENT
Start: 2025-05-27

## 2025-09-03 DIAGNOSIS — E03.8 CENTRAL HYPOTHYROIDISM: Primary | ICD-10-CM

## 2025-09-04 RX ORDER — LEVOTHYROXINE SODIUM 25 UG/1
25 TABLET ORAL DAILY
Qty: 30 TABLET | Refills: 5 | Status: SHIPPED | OUTPATIENT
Start: 2025-09-04

## (undated) DEVICE — KENDALL 450 SERIES MONITORING FOAM ELECTRODE - RECTANGULAR SHAPE ( 3/PK): Brand: KENDALL

## (undated) DEVICE — KIT BEDSIDE REVITAL OX 500ML

## (undated) DEVICE — BLOCK BITE 60FR CAREGUARD

## (undated) DEVICE — Device: Brand: BALLOON3

## (undated) DEVICE — 6 X 9  1.75MIL 4-WALL LABGUARD: Brand: MINIGRIP COMMERCIAL LLC

## (undated) DEVICE — Device: Brand: DEFENDO VALVE AND CONNECTOR KIT

## (undated) DEVICE — CONTAINER SPEC COLL 960ML POLYPR TRIANG GRAD INTAKE/OUTPUT

## (undated) DEVICE — TUBING, SUCTION, 1/4" X 10', STRAIGHT: Brand: MEDLINE

## (undated) DEVICE — CLOTH SURG PREP PREOPERATIVE CHLORHEXIDINE GLUC 2% READYPREP

## (undated) DEVICE — Z INACTIVE USE 2762646 COVER ENDOSCP INSTRUMENT DSTL CVR DIP 20/EA

## (undated) DEVICE — SPONGE GZ 4IN 4IN 4 PLY N WVN AVANT

## (undated) DEVICE — VALVE SUCTION AIR H2O HYDR H2O JET CONN STRL ORCA POD + DISP

## (undated) DEVICE — RETRIEVAL BALLOON CATHETER: Brand: EXTRACTOR™ PRO RX

## (undated) DEVICE — SYSTEM BX CAP BILI RAP EXCHG CAP LOK DEV COMPATIBLE W/ OLY

## (undated) DEVICE — ELECTRODE PT RET AD L9FT HI MOIST COND ADH HYDRGEL CORDED

## (undated) DEVICE — GOWN ISOLATN REG YEL M WT MULTIPLY SIDETIE LEV 2

## (undated) DEVICE — YANKAUER,BULB TIP,W/O VENT,RIGID,STERILE: Brand: MEDLINE

## (undated) DEVICE — MASK,FACE,MAXFLUIDPROTECT,SHIELD/ERLPS: Brand: MEDLINE

## (undated) DEVICE — SPHINCTEROTOME: Brand: HYDRATOME RX 44

## (undated) DEVICE — ADAPTER CLEANING PORPOISE CLEANING

## (undated) DEVICE — LUBRICANT SURG JELLY ST BACTER TUBE 4.25OZ

## (undated) DEVICE — SYSTEM INJ BILI RAP REFIL CONT